# Patient Record
Sex: FEMALE | Race: WHITE | NOT HISPANIC OR LATINO | Employment: STUDENT | URBAN - METROPOLITAN AREA
[De-identification: names, ages, dates, MRNs, and addresses within clinical notes are randomized per-mention and may not be internally consistent; named-entity substitution may affect disease eponyms.]

---

## 2017-08-24 ENCOUNTER — TELEPHONE (OUTPATIENT)
Dept: UROLOGY | Facility: CLINIC | Age: 22
End: 2017-08-24

## 2017-08-28 ENCOUNTER — OFFICE VISIT (OUTPATIENT)
Dept: UROLOGY | Facility: CLINIC | Age: 22
End: 2017-08-28
Payer: COMMERCIAL

## 2017-08-28 ENCOUNTER — HOSPITAL ENCOUNTER (OUTPATIENT)
Dept: RADIOLOGY | Facility: HOSPITAL | Age: 22
Discharge: HOME | End: 2017-08-28
Attending: NURSE PRACTITIONER
Payer: COMMERCIAL

## 2017-08-28 ENCOUNTER — DOCUMENTATION ONLY (OUTPATIENT)
Dept: UROLOGY | Facility: CLINIC | Age: 22
End: 2017-08-28

## 2017-08-28 VITALS
BODY MASS INDEX: 20.21 KG/M2 | HEIGHT: 64 IN | SYSTOLIC BLOOD PRESSURE: 107 MMHG | HEART RATE: 58 BPM | TEMPERATURE: 98 F | WEIGHT: 118.38 LBS | DIASTOLIC BLOOD PRESSURE: 63 MMHG

## 2017-08-28 DIAGNOSIS — R10.9 BILATERAL FLANK PAIN: Primary | ICD-10-CM

## 2017-08-28 DIAGNOSIS — R39.15 URINARY URGENCY: ICD-10-CM

## 2017-08-28 DIAGNOSIS — R10.9 BILATERAL FLANK PAIN: ICD-10-CM

## 2017-08-28 DIAGNOSIS — R35.0 URINARY FREQUENCY: ICD-10-CM

## 2017-08-28 DIAGNOSIS — N13.30 HYDRONEPHROSIS, UNSPECIFIED HYDRONEPHROSIS TYPE: ICD-10-CM

## 2017-08-28 PROCEDURE — 99999 PR PBB SHADOW E&M-EST. PATIENT-LVL IV: CPT | Mod: PBBFAC,,, | Performed by: NURSE PRACTITIONER

## 2017-08-28 PROCEDURE — 74176 CT ABD & PELVIS W/O CONTRAST: CPT | Mod: TC

## 2017-08-28 PROCEDURE — 3008F BODY MASS INDEX DOCD: CPT | Mod: S$GLB,,, | Performed by: NURSE PRACTITIONER

## 2017-08-28 PROCEDURE — 99204 OFFICE O/P NEW MOD 45 MIN: CPT | Mod: S$GLB,,, | Performed by: NURSE PRACTITIONER

## 2017-08-28 PROCEDURE — 74176 CT ABD & PELVIS W/O CONTRAST: CPT | Mod: 26,,, | Performed by: RADIOLOGY

## 2017-08-28 RX ORDER — HYDROCODONE BITARTRATE AND ACETAMINOPHEN 5; 325 MG/1; MG/1
1 TABLET ORAL EVERY 6 HOURS PRN
Qty: 30 TABLET | Refills: 0 | Status: SHIPPED | OUTPATIENT
Start: 2017-08-28 | End: 2017-09-07

## 2017-08-28 RX ORDER — LEVOTHYROXINE SODIUM 25 UG/1
TABLET ORAL
Refills: 1 | COMMUNITY
Start: 2017-08-14 | End: 2018-03-21

## 2017-08-28 RX ORDER — TRAMADOL HYDROCHLORIDE 50 MG/1
TABLET ORAL
Refills: 0 | COMMUNITY
Start: 2017-08-14 | End: 2017-08-28 | Stop reason: ALTCHOICE

## 2017-08-28 NOTE — PATIENT INSTRUCTIONS
Flank Pain, Uncertain Cause  The flank is the area between your upper abdomen and your back. Pain there is often caused by a problem with your kidneys. It might be a kidney infection or a kidney stone. Other causes of flank pain include spinal arthritis, a pinched nerve from a back injury, or a back muscle strain or spasm.  The cause of your flank pain is not certain. You may need other tests.  Home care  Follow these tips when caring for yourself at home:  · You may use acetaminophen or ibuprofen to control pain, unless your health care provider prescribed another medicine. If you have chronic liver or kidney disease, talk with your provider before taking these medicines. Also talk with your provider first if youve ever had a stomach ulcer or GI bleeding.  · If the pain is coming from your muscles, you may get relief with ice or heat. During the first 2 days after the injury, put an ice pack on the painful area for 20 minutes every 2 to 4 hours. This will reduce swelling and pain. A hot shower, hot bath, or heating pad works well for a muscle spasm. You can start with ice, then switch to heat after 2 days. You might find that alternating ice and heat works well. Use the method that feels the best to you.  Follow-up care  Follow up with your healthcare provider if your symptoms dont get better over the next few days.  When to seek medical advice  Call your healthcare provider right away if any of these happen:  · Repeated vomiting  · Fever of 100.4ºF (38ºC) or higher, or as directed by your health care provider  · Flank pain that gets worse  · Pain that spreads to the front of your belly (abdomen)  · Dizziness, weakness, or fainting  · Blood in your urine  · Burning feeling when you urinate or the need to urinate often  · Pain in one of your legs that gets worse  · Numbness or weakness in a leg  Date Last Reviewed: 10/1/2016  © 5274-0516 [employer]. [address] [place] [address], [place] [address]. All  rights reserved. This information is not intended as a substitute for professional medical care. Always follow your healthcare professional's instructions.

## 2017-08-28 NOTE — PROGRESS NOTES
[facility] Note  Staff: MARIO Fields-C      Chief Complaint: Bilateral flank pain, urinary urgency and frequency    Subjective:        HPI: Briseida Quiles is a 22 y.o. female new patient to Urology clinic presents with bilateral flank pain, urinary urgency and frequency which began three weeks ago.  Pt initially went to Dr. Bridget Blackburn for evaluation of symptoms and was treated for possible kidney infection.    The following imaging was performed at [facility] imaging on 08/23/17:  Abdominal US:  The right kidney is to be within the range of normal in size and appearance.  The LEFT kidney demonstrates what appears to be mild hydronephrosis with because not identified and with findings not definitive and possible/chronic/congenital.  US findings and the pt's clinical history are concerning for potential renal calculus disease.  Noncontrast CT of the abdomen and pelvis is suggested for further workup.    KUB:  IMPRESSION:  Possible left mid ureteral calculus vs. Artifact overlying the upper left sacral wing as detailed and discussed above.    Questions asked pt during office visit today:  Pt has increased urgency and frequency of urination within last three weeks.  +Bilateral flank pain-more prominently on left side  No dysuria, no hx of kidney stones  G0, P 0,   Gross Hematuria:  Yes - one week ago.  History of UTI: Yes - since young age.  Sexually active?: Yes, she is not using any form of BC  LMP: one month ago, unsure when her last cycle was.  LBM: This morning, but she states having constipation issues.    Caffeine intake daily: at least 3 bottles of water daily, one coke every other day.    REVIEW OF SYSTEMS:  Review of Systems   Constitutional: Negative for chills, diaphoresis, fever and weight loss.   HENT: Negative for congestion, hearing loss, nosebleeds and sore throat.    Eyes: Negative for blurred vision and pain.   Respiratory: Negative for cough and wheezing.     Cardiovascular: Negative for chest pain, palpitations and leg swelling.   Gastrointestinal: Negative for abdominal pain, heartburn, nausea and vomiting.   Genitourinary: Positive for flank pain, frequency and urgency. Negative for dysuria and hematuria.   Musculoskeletal: Negative for back pain, joint pain, myalgias and neck pain.   Skin: Negative for itching and rash.   Neurological: Negative for dizziness, tremors, sensory change, seizures, loss of consciousness, weakness and headaches.   Endo/Heme/Allergies: Does not bruise/bleed easily.   Psychiatric/Behavioral: Negative for depression and suicidal ideas. The patient is not nervous/anxious.      PMHx:  Past Medical History:   Diagnosis Date    Mononucleosis 2011     Kidney stones: No    PSHx:  History reviewed. No pertinent surgical history.      Fam Hx:   malignancies: No , gyn malignancies: No   kidney stones: No     Social History     Social History    Marital status: Single     Spouse name: N/A    Number of children: N/A    Years of education: N/A     Social History Main Topics    Smoking status: Never Smoker    Smokeless tobacco: Never Used    Alcohol use No    Drug use: No    Sexual activity: No     Other Topics Concern    None     Social History Narrative    Going to college 2015/2016 at [employer] for [employer]     Allergies:  Review of patient's allergies indicates no known allergies.    Medications: reviewed   Anticoagulation: No    Objective:     Vitals:    08/28/17 1429   BP: 107/63   Pulse: (!) 58   Temp: 98.1 °F (36.7 °C)     Physical Exam   Vitals reviewed.  Constitutional: She is oriented to person, place, and time. She appears well-developed and well-nourished.   HENT:   Head: Normocephalic and atraumatic.   Eyes: Conjunctivae and EOM are normal. Pupils are equal, round, and reactive to light.   Neck: Normal range of motion. Neck supple.   Cardiovascular: Normal rate, regular rhythm, normal heart sounds and intact distal pulses.     Pulmonary/Chest: Effort normal and breath sounds normal.   Abdominal: Soft. Bowel sounds are normal.   Musculoskeletal: Normal range of motion.   Neurological: She is alert and oriented to person, place, and time. She has normal reflexes.   Skin: Skin is warm and dry.     Psychiatric: She has a normal mood and affect. Her behavior is normal. Judgment and thought content normal.     LABS REVIEW:  UA today: Color:Clear, Yellow  Spec. Grav.  1.000  PH  7.0  Negative for leukocytes, nitrates, protein, glucose, ketones, urobili, bili, and blood.    Cr:   Lab Results   Component Value Date    CREATININE 0.7 04/10/2014     Assessment:       1. Bilateral flank pain    2. Hydronephrosis, unspecified hydronephrosis type    3. Urinary urgency    4. Urinary frequency          Plan:   Left hydronephrosis of unknown cause vs. Left ureteral calculus:    1.  Call Dr. Blackburn's office and get latest CBC and CMP results done two weeks ago as stated by the patient.  2.  Pregnancy test to be performed by lab today.  3.  CT RSS for further evaluation of symptoms and as requested from previous xray results.  4.  Norco 5-325 prn pain prescribed to the patient during ov today.    F/u TBD    [facility]: Active    BRO Corrales

## 2017-08-28 NOTE — LETTER
August 28, 2017      Bridget Blackburn, JOSE  [address] [facility] [address]           [facility]  [address]  [facility] [address]  Phone: [phone]          Patient: Briseida Quiles   MR Number: [id number]   YOB: 1995   Date of Visit: 8/28/2017       Dear Bridget Blackburn:    Thank you for referring Briseida Quiles to me for evaluation. Attached you will find relevant portions of my assessment and plan of care.    If you have questions, please do not hesitate to call me. I look forward to following Briseida Quiles along with you.    Sincerely,    Bridget Kaiser, [facility]    Enclosure  CC:  No Recipients    If you would like to receive this communication electronically, please contact [email or web address] or [phone] to request more information on [employer] Link access.    For providers and/or their staff who would like to refer a patient to [facility], please contact us through our one-stop-shop provider referral line, [facility], at [phone].    If you feel you have received this communication in error or would no longer like to receive these types of communications, please e-mail [email or web address]

## 2017-08-28 NOTE — PROGRESS NOTES
Labwork just received after pt left:  [facility]-collected on 08/10/17 by Dr. Bridget Blackburn's office.    BUN/Cr: 14/0.85  GFR: 99    CBC WNL

## 2017-09-05 ENCOUNTER — OFFICE VISIT (OUTPATIENT)
Dept: UROLOGY | Facility: CLINIC | Age: 22
End: 2017-09-05
Payer: COMMERCIAL

## 2017-09-05 VITALS
HEIGHT: 65 IN | SYSTOLIC BLOOD PRESSURE: 118 MMHG | WEIGHT: 116.63 LBS | BODY MASS INDEX: 19.43 KG/M2 | HEART RATE: 74 BPM | DIASTOLIC BLOOD PRESSURE: 75 MMHG | TEMPERATURE: 98 F

## 2017-09-05 DIAGNOSIS — N32.81 OAB (OVERACTIVE BLADDER): Primary | ICD-10-CM

## 2017-09-05 DIAGNOSIS — K59.00 CONSTIPATION, UNSPECIFIED CONSTIPATION TYPE: ICD-10-CM

## 2017-09-05 LAB
BILIRUB SERPL-MCNC: NORMAL MG/DL
BLOOD URINE, POC: NORMAL
COLOR, POC UA: YELLOW
GLUCOSE UR QL STRIP: NORMAL
KETONES UR QL STRIP: NORMAL
LEUKOCYTE ESTERASE URINE, POC: NORMAL
NITRITE, POC UA: NORMAL
PH, POC UA: 5
PROTEIN, POC: NORMAL
SPECIFIC GRAVITY, POC UA: 1.02
UROBILINOGEN, POC UA: NORMAL

## 2017-09-05 PROCEDURE — 81002 URINALYSIS NONAUTO W/O SCOPE: CPT | Mod: S$GLB,,, | Performed by: UROLOGY

## 2017-09-05 PROCEDURE — 99214 OFFICE O/P EST MOD 30 MIN: CPT | Mod: 25,S$GLB,, | Performed by: UROLOGY

## 2017-09-05 PROCEDURE — 99999 PR PBB SHADOW E&M-EST. PATIENT-LVL III: CPT | Mod: PBBFAC,,, | Performed by: UROLOGY

## 2017-09-05 PROCEDURE — 3008F BODY MASS INDEX DOCD: CPT | Mod: S$GLB,,, | Performed by: UROLOGY

## 2017-09-05 NOTE — PROGRESS NOTES
[facility] Note - [place]   Staff: MD Carla    Referring provider and please cc: Reggie  PCP: Dr.Melissa Alexey Thomastoni: active    Chief Complaint: hydronephrosis and urinary discomfort    Subjective:        HPI: Briseida Quiles is a 22 y.o. female presents with     Pt initially saw her pcp  about a month ago for bilateral flank pain and urinary frequency, urgency, some incontinence. She was treated with abx bid x 1 week but her sx did not improve. She feels like her bladder was not emptying. They obtained an ultrasound 8/23/17 of her kidneys which some mild swelling on her left side and an xray which showed possible stone in left ureter. She was then seen by azam on 8/28/17 with similar complaints. Urine that day was negative for blood or infection. She ordered a ct renal stone study which showed no stones, no hydro and large amount of stool in her colon.     She returns today stating she still having feelings of incomplete empyting, no flank pain. Frequency q1 hour. Some urethral pain today. +urgency occasionally. No urge incotinence in the past few weeks.    She has a h/o significant constipation since 7th grade (didn't have a bm for a month), had a workup. Since then she has a bm every other day to one x week and it's hard stool. Had tried to take stool softener. Tried colace for a few days but was concerned about.     Has had sx of uti's for many years similar to what she has now. Has had abx 1-2x before. Has never seen a urologist before. Never urologic issues as a kidney. Potty trained at 18 months. No bedwetting. No coffee, tea or coke. Drinks 3 bottles of water a day.     Gf with h/o stones    ECOG Status: 0    G0, P 0  Sexually active - does not think sx ass'd with intercoruse.    REVIEW OF SYSTEMS:  General ROS: no fevers, no chills  Psychological ROS: no depression  Endocrine ROS: no heat or cold  Respiratory ROS: no SOB  Cardiovascular ROS: + CP, recently  diagnosed with levothryoxine  Gastrointestinal ROS: no abdominal pain, + constipation, no diarrhea, noBRBPR  Musculoskeletal ROS: no muscle pain  Neurological ROS: no headaches  Dermatological ROS: + rashes assd' with new medicine use  HEENT: no glasses, no sinus   ROS: per HPI     PMHx:  Past Medical History:   Diagnosis Date    Mononucleosis 2011   hypothyroid  Kidney stones: Yes - possibly?     PSHx:  No past surgical history on file.  Urologic or Gynecologic Surgery: none    Stents/Valves/Foreign Bodies: No  Cardiac Evaluation: No    Screening Studies  Colonoscopy: none    Fam Hx:   malignancies: No , gyn malignancies: Yes - maternal GGM with breast cancer .   kidney stones: Yes - maternal gF with stones     Soc Hx:  No tobacco  occ alcohol  Lives in , parents in [place]  :unmarried  Children: no kids  Occupation: [employer] at [employer]    Allergies:  Review of patient's allergies indicates no known allergies.    Medications: reviewed   Anticoagulation: No    Objective:     Vitals:    09/05/17 1055   BP: 118/75   Pulse: 74   Temp: 98.4 °F (36.9 °C)     General:WDWN in NAD  Eyes: PERRLA, normal conjunctiva  Respiratory: no increased work on breathing, clear to auscultation  Cardiovascular: regular rate and rhythm. No obvious extremity edema.  GI: no palpation of masses. No tenderness. No hepatosplenomegaly to palpation.  Musculoskeletal: normal range of motion of bilateral upper extremities. Normal muscle strength and tone.  Skin: no obvious rashes or lesions. No tightening of skin noted.  Neurologic: CN grossly normal. Normal sensation.   Psychiatric: awake, alert and oriented x 3. Mood and affect normal. Cooperative.    Pelvic exam  Deferred    LABS REVIEW:  UA today: 1.025/5/remainder neg  UCx:   none  Cr:   Lab Results   Component Value Date    CREATININE 0.7 04/10/2014       PATHOLOGY REVIEW:  none    RADIOGRAPHIC REVIEW:  Ctrss 8/28/17  Small amount of free fluid identified in the right cul-de-sac  otherwise negative CT of the abdomen and pelvis.  No evidence of kidney stones or hydronephrosis.    Colon appears to be filled with stool slightly anterior to bladder  5cm colon with stool.     rbus 8/23/17 dis, will load images  Mild hydro in left kidney  Right kidney normal    kub 8/23/17 dis  Possible left mid ureteral calculus   Other findings: slight congential scoliosis      Assessment:       1. OAB (overactive bladder)    2. Constipation, unspecified constipation type        Plan:     I think her oab and urgency is related to her significant constipation which she has had for years. Review of her ct and review of her history with her today reveals. She could possibly have passed a small stone as well but these symptoms have been present for some time.    Recommend capful daily until she has bm/diarrhea then titrate to soft bm's. Went over [facility] stool chart. We want type 4.   She will then f/u in 4-8 weeks to see if she has improvement of symptomst  If not may consider referral to GI    Will eventually titrate off miralax.    F/u 4-8 weeks  She can contact me via [employer] with any questions      Shwetha Aguirre MD

## 2017-09-25 ENCOUNTER — TELEPHONE (OUTPATIENT)
Dept: UROLOGY | Facility: CLINIC | Age: 22
End: 2017-09-25

## 2018-03-21 ENCOUNTER — HOSPITAL ENCOUNTER (INPATIENT)
Facility: HOSPITAL | Age: 23
LOS: 3 days | Discharge: HOME | DRG: 872 | End: 2018-03-24
Attending: EMERGENCY MEDICINE | Admitting: INTERNAL MEDICINE
Payer: COMMERCIAL

## 2018-03-21 DIAGNOSIS — R65.20 SEVERE SEPSIS: Primary | ICD-10-CM

## 2018-03-21 DIAGNOSIS — N12 PYELONEPHRITIS: ICD-10-CM

## 2018-03-21 DIAGNOSIS — A41.9 SEVERE SEPSIS: Primary | ICD-10-CM

## 2018-03-21 PROBLEM — E03.9 HYPOTHYROIDISM: Chronic | Status: ACTIVE | Noted: 2018-03-21

## 2018-03-21 LAB
ALBUMIN SERPL BCP-MCNC: 4.8 G/DL
ALP SERPL-CCNC: 77 U/L
ALT SERPL W/O P-5'-P-CCNC: 14 U/L
ANION GAP SERPL CALC-SCNC: 14 MMOL/L
APTT BLDCRRT: 28.2 SEC
AST SERPL-CCNC: 23 U/L
B-HCG UR QL: NEGATIVE
BASOPHILS # BLD AUTO: 0 K/UL
BASOPHILS NFR BLD: 0 %
BILIRUB SERPL-MCNC: 0.5 MG/DL
BILIRUB UR QL STRIP: NEGATIVE
BUN SERPL-MCNC: 12 MG/DL
CALCIUM SERPL-MCNC: 10.1 MG/DL
CHLORIDE SERPL-SCNC: 103 MMOL/L
CLARITY UR: CLEAR
CO2 SERPL-SCNC: 22 MMOL/L
COLOR UR: ABNORMAL
COLOR UR: ABNORMAL
COLOR UR: YELLOW
CREAT SERPL-MCNC: 0.7 MG/DL
CTP QC/QA: YES
DIFFERENTIAL METHOD: ABNORMAL
EOSINOPHIL # BLD AUTO: 0 K/UL
EOSINOPHIL NFR BLD: 0.2 %
ERYTHROCYTE [DISTWIDTH] IN BLOOD BY AUTOMATED COUNT: 13.4 %
EST. GFR  (AFRICAN AMERICAN): >60 ML/MIN/1.73 M^2
EST. GFR  (NON AFRICAN AMERICAN): >60 ML/MIN/1.73 M^2
GLUCOSE SERPL-MCNC: 99 MG/DL
GLUCOSE UR QL STRIP: NEGATIVE
HCT VFR BLD AUTO: 38.2 %
HGB BLD-MCNC: 12.9 G/DL
HGB UR QL STRIP: NEGATIVE
INR PPP: 1.1
KETONES UR QL STRIP: ABNORMAL
KETONES UR QL STRIP: ABNORMAL
KETONES UR QL STRIP: NEGATIVE
LACTATE SERPL-SCNC: 1.1 MMOL/L
LACTATE SERPL-SCNC: 3.5 MMOL/L
LEUKOCYTE ESTERASE UR QL STRIP: NEGATIVE
LYMPHOCYTES # BLD AUTO: 1.2 K/UL
LYMPHOCYTES NFR BLD: 7.5 %
MAGNESIUM SERPL-MCNC: 2.3 MG/DL
MCH RBC QN AUTO: 30.3 PG
MCHC RBC AUTO-ENTMCNC: 33.7 G/DL
MCV RBC AUTO: 90 FL
MONOCYTES # BLD AUTO: 0.8 K/UL
MONOCYTES NFR BLD: 5.5 %
NEUTROPHILS # BLD AUTO: 13.3 K/UL
NEUTROPHILS NFR BLD: 86.8 %
NITRITE UR QL STRIP: NEGATIVE
PH UR STRIP: 6 [PH] (ref 5–8)
PHOSPHATE SERPL-MCNC: 3 MG/DL
PLATELET # BLD AUTO: 263 K/UL
PMV BLD AUTO: 8.4 FL
POCT GLUCOSE: 87 MG/DL (ref 70–110)
POTASSIUM SERPL-SCNC: 4 MMOL/L
PROT SERPL-MCNC: 9 G/DL
PROT UR QL STRIP: NEGATIVE
PROTHROMBIN TIME: 11.2 SEC
RBC # BLD AUTO: 4.25 M/UL
SODIUM SERPL-SCNC: 139 MMOL/L
SP GR UR STRIP: 1.01 (ref 1–1.03)
SP GR UR STRIP: 1.01 (ref 1–1.03)
SP GR UR STRIP: <=1.005 (ref 1–1.03)
URN SPEC COLLECT METH UR: ABNORMAL
UROBILINOGEN UR STRIP-ACNC: NEGATIVE EU/DL
WBC # BLD AUTO: 15.3 K/UL

## 2018-03-21 PROCEDURE — 82962 GLUCOSE BLOOD TEST: CPT

## 2018-03-21 PROCEDURE — 81025 URINE PREGNANCY TEST: CPT | Performed by: EMERGENCY MEDICINE

## 2018-03-21 PROCEDURE — 12000002 HC ACUTE/MED SURGE SEMI-PRIVATE ROOM

## 2018-03-21 PROCEDURE — 25500020 PHARM REV CODE 255

## 2018-03-21 PROCEDURE — 36415 COLL VENOUS BLD VENIPUNCTURE: CPT

## 2018-03-21 PROCEDURE — 87040 BLOOD CULTURE FOR BACTERIA: CPT

## 2018-03-21 PROCEDURE — 96375 TX/PRO/DX INJ NEW DRUG ADDON: CPT

## 2018-03-21 PROCEDURE — 93005 ELECTROCARDIOGRAM TRACING: CPT

## 2018-03-21 PROCEDURE — 83605 ASSAY OF LACTIC ACID: CPT

## 2018-03-21 PROCEDURE — 99223 1ST HOSP IP/OBS HIGH 75: CPT | Mod: ,,, | Performed by: INTERNAL MEDICINE

## 2018-03-21 PROCEDURE — 25000003 PHARM REV CODE 250: Performed by: EMERGENCY MEDICINE

## 2018-03-21 PROCEDURE — 84100 ASSAY OF PHOSPHORUS: CPT

## 2018-03-21 PROCEDURE — 63600175 PHARM REV CODE 636 W HCPCS: Performed by: EMERGENCY MEDICINE

## 2018-03-21 PROCEDURE — 80053 COMPREHEN METABOLIC PANEL: CPT

## 2018-03-21 PROCEDURE — 85025 COMPLETE CBC W/AUTO DIFF WBC: CPT

## 2018-03-21 PROCEDURE — 87086 URINE CULTURE/COLONY COUNT: CPT

## 2018-03-21 PROCEDURE — 81003 URINALYSIS AUTO W/O SCOPE: CPT

## 2018-03-21 PROCEDURE — 99285 EMERGENCY DEPT VISIT HI MDM: CPT | Mod: 25

## 2018-03-21 PROCEDURE — 96365 THER/PROPH/DIAG IV INF INIT: CPT

## 2018-03-21 PROCEDURE — 83735 ASSAY OF MAGNESIUM: CPT

## 2018-03-21 PROCEDURE — 85730 THROMBOPLASTIN TIME PARTIAL: CPT

## 2018-03-21 PROCEDURE — 96361 HYDRATE IV INFUSION ADD-ON: CPT

## 2018-03-21 PROCEDURE — 85610 PROTHROMBIN TIME: CPT

## 2018-03-21 RX ORDER — ONDANSETRON 2 MG/ML
4 INJECTION INTRAMUSCULAR; INTRAVENOUS EVERY 8 HOURS PRN
Status: DISCONTINUED | OUTPATIENT
Start: 2018-03-21 | End: 2018-03-24 | Stop reason: HOSPADM

## 2018-03-21 RX ORDER — PANTOPRAZOLE SODIUM 40 MG/1
40 TABLET, DELAYED RELEASE ORAL DAILY
Status: DISCONTINUED | OUTPATIENT
Start: 2018-03-22 | End: 2018-03-24 | Stop reason: HOSPADM

## 2018-03-21 RX ORDER — HYDROCODONE BITARTRATE AND ACETAMINOPHEN 5; 325 MG/1; MG/1
1 TABLET ORAL EVERY 6 HOURS PRN
Status: DISCONTINUED | OUTPATIENT
Start: 2018-03-21 | End: 2018-03-24 | Stop reason: HOSPADM

## 2018-03-21 RX ORDER — MORPHINE SULFATE 4 MG/ML
4 INJECTION, SOLUTION INTRAMUSCULAR; INTRAVENOUS
Status: COMPLETED | OUTPATIENT
Start: 2018-03-21 | End: 2018-03-21

## 2018-03-21 RX ORDER — KETOROLAC TROMETHAMINE 30 MG/ML
15 INJECTION, SOLUTION INTRAMUSCULAR; INTRAVENOUS EVERY 6 HOURS PRN
Status: DISCONTINUED | OUTPATIENT
Start: 2018-03-21 | End: 2018-03-24 | Stop reason: HOSPADM

## 2018-03-21 RX ORDER — ONDANSETRON HYDROCHLORIDE 4 MG/5ML
8 SOLUTION ORAL EVERY 8 HOURS PRN
Status: DISCONTINUED | OUTPATIENT
Start: 2018-03-21 | End: 2018-03-24 | Stop reason: HOSPADM

## 2018-03-21 RX ORDER — SODIUM CHLORIDE 9 MG/ML
INJECTION, SOLUTION INTRAVENOUS
Status: DISPENSED
Start: 2018-03-21 | End: 2018-03-22

## 2018-03-21 RX ORDER — THYROID 30 MG/1
30 TABLET ORAL
Status: DISCONTINUED | OUTPATIENT
Start: 2018-03-22 | End: 2018-03-22

## 2018-03-21 RX ORDER — SODIUM CHLORIDE 9 MG/ML
INJECTION, SOLUTION INTRAVENOUS CONTINUOUS
Status: DISCONTINUED | OUTPATIENT
Start: 2018-03-22 | End: 2018-03-24 | Stop reason: HOSPADM

## 2018-03-21 RX ORDER — THYROID 30 MG/1
30 TABLET ORAL
Status: ON HOLD | COMMUNITY
End: 2018-03-24 | Stop reason: HOSPADM

## 2018-03-21 RX ORDER — AMOXICILLIN 250 MG
1 CAPSULE ORAL 2 TIMES DAILY PRN
Status: DISCONTINUED | OUTPATIENT
Start: 2018-03-21 | End: 2018-03-24 | Stop reason: HOSPADM

## 2018-03-21 RX ORDER — ACETAMINOPHEN 325 MG/1
650 TABLET ORAL EVERY 6 HOURS PRN
Status: DISCONTINUED | OUTPATIENT
Start: 2018-03-21 | End: 2018-03-24 | Stop reason: HOSPADM

## 2018-03-21 RX ADMIN — MORPHINE SULFATE 4 MG: 4 INJECTION INTRAVENOUS at 05:03

## 2018-03-21 RX ADMIN — ONDANSETRON HYDROCHLORIDE 4 MG: 2 INJECTION INTRAMUSCULAR; INTRAVENOUS at 09:03

## 2018-03-21 RX ADMIN — STANDARDIZED SENNA CONCENTRATE AND DOCUSATE SODIUM 1 TABLET: 8.6; 5 TABLET, FILM COATED ORAL at 10:03

## 2018-03-21 RX ADMIN — PIPERACILLIN AND TAZOBACTAM 4.5 G: 4; .5 INJECTION, POWDER, LYOPHILIZED, FOR SOLUTION INTRAVENOUS; PARENTERAL at 05:03

## 2018-03-21 RX ADMIN — IOHEXOL 75 ML: 350 INJECTION, SOLUTION INTRAVENOUS at 04:03

## 2018-03-21 RX ADMIN — SODIUM CHLORIDE 1632 ML: 0.9 INJECTION, SOLUTION INTRAVENOUS at 04:03

## 2018-03-21 NOTE — LETTER
March 24, 2018         [address] [place] [address]  Phone: [phone]       Patient: Briseida Quiles   YOB: 1995  Date of Visit: 03/24/2018    To Whom It May Concern:    Brian Quiles  was at [facility] on 3/21/2018 to 03/24/2018. SHE may return to work/school on 3/25/2018, with no restrictions. If you have any questions or concerns, or if I can be of further assistance, please do not hesitate to contact me.    Sincerely,    Viky Cottrell RN

## 2018-03-21 NOTE — ED PROVIDER NOTES
"Encounter Date: 3/21/2018    SCRIBE #1 NOTE: IFredi, am scribing for, and in the presence of, Dr. Burrell .       History     Chief Complaint   Patient presents with    Abdominal Pain     x 3 weeks        03/21/2018 4:10 PM     Chief complaint: Abd pain       Briseida Quiles is a 22 y.o. female with a hx of Mononucleosis who presents to the ED with complaints of throat pain and diffuse abd pain x 3 weeks. Associated sx are back pain,  nausea, urinary urgency, frequency, and dysuria. She also reports "light" stool 2 days ago. Per mother, PCP reported concern of UTI and pyelonephrosis and advised she present to the ED for further evaluation. She was recently on abx. Bronchitis and Influenza. Pt is sexually active with one partner in the past 6 months. She denies vaginal discharge, diarrhea, vomiting, fever, chills, and diaphoresis. NKDA noted.       The history is provided by the patient.     Review of patient's allergies indicates:  No Known Allergies  Past Medical History:   Diagnosis Date    Mononucleosis 2011    RSV (acute bronchiolitis due to respiratory syncytial virus)      Past Surgical History:   Procedure Laterality Date    WISDOM TOOTH EXTRACTION       Family History   Problem Relation Age of Onset    Diabetes Maternal Grandmother     Obesity Maternal Grandmother     Heart disease Maternal Grandfather     Diabetes Maternal Grandfather     Hypertension Maternal Grandfather     Cancer Maternal Grandfather     Hyperlipidemia Paternal Grandmother     ADD / ADHD Neg Hx     Alcohol abuse Neg Hx     Allergies Neg Hx     Asthma Neg Hx     Autism spectrum disorder Neg Hx     Behavior problems Neg Hx     Birth defects Neg Hx     Chromosomal disorder Neg Hx     Cleft lip Neg Hx     Congenital heart disease Neg Hx     Depression Neg Hx     Early death Neg Hx     Eczema Neg Hx     Hearing loss Neg Hx     Kidney disease Neg Hx     Learning disabilities Neg Hx     Mental illness Neg Hx  "    Migraines Neg Hx     Neurodegenerative disease Neg Hx     Seizures Neg Hx     SIDS Neg Hx     Thyroid disease Neg Hx     Other Neg Hx      Social History   Substance Use Topics    Smoking status: Never Smoker    Smokeless tobacco: Never Used    Alcohol use Yes      Comment: last drink over a month, socially      Review of Systems   Constitutional: Negative for chills, diaphoresis, fatigue and fever.   HENT: Positive for sore throat.    Eyes: Negative for redness.   Respiratory: Negative for shortness of breath.    Cardiovascular: Negative for chest pain.   Gastrointestinal: Positive for abdominal pain and nausea. Negative for diarrhea and vomiting.   Genitourinary: Positive for dysuria, frequency and urgency. Negative for vaginal discharge.   Musculoskeletal: Positive for back pain.   Skin: Negative for rash.   Neurological: Negative for weakness.   Hematological: Does not bruise/bleed easily.       Physical Exam     Initial Vitals [03/21/18 1450]   BP Pulse Resp Temp SpO2   (!) 146/72 (!) 127 20 98.1 °F (36.7 °C) 95 %      MAP       96.67         Physical Exam    Nursing note and vitals reviewed.  Constitutional: She appears well-developed and well-nourished.  Non-toxic appearance. No distress.   HENT:   Head: Normocephalic and atraumatic.   Mouth/Throat: Mucous membranes are dry.   Eyes: EOM are normal. Pupils are equal, round, and reactive to light.   Neck: Normal range of motion. Neck supple. No neck rigidity. No JVD present.   Cardiovascular: Normal rate, regular rhythm, normal heart sounds and intact distal pulses. Exam reveals no gallop and no friction rub.    No murmur heard.  Pulmonary/Chest: Breath sounds normal. She has no wheezes. She has no rhonchi. She has no rales.   Abdominal: Soft. Bowel sounds are normal. She exhibits no distension. There is tenderness in the right upper quadrant, right lower quadrant, left upper quadrant and left lower quadrant. There is no rigidity, no rebound and no  guarding.   Most tender on lower quadrants.    Musculoskeletal: Normal range of motion.   Bilateral CVA tenderness.    Neurological: She is alert and oriented to person, place, and time. She has normal strength and normal reflexes. No cranial nerve deficit or sensory deficit. She exhibits normal muscle tone. Coordination normal. GCS eye subscore is 4. GCS verbal subscore is 5. GCS motor subscore is 6.   Skin: Skin is warm and dry.   Psychiatric: She has a normal mood and affect. Her speech is normal and behavior is normal. She is not actively hallucinating.         ED Course   Procedures  Labs Reviewed   URINALYSIS - Abnormal; Notable for the following:        Result Value    Specific Gravity, UA <=1.005 (*)     All other components within normal limits   COMPREHENSIVE METABOLIC PANEL - Abnormal; Notable for the following:     CO2 22 (*)     Total Protein 9.0 (*)     All other components within normal limits   LACTIC ACID, PLASMA - Abnormal; Notable for the following:     Lactate (Lactic Acid) 3.5 (*)     All other components within normal limits    Narrative:     Lactic acid critical result(s) called and verbal readback obtained   from Jamin De La O.  , 03/21/2018 17:42   CBC W/ AUTO DIFFERENTIAL - Abnormal; Notable for the following:     WBC 15.30 (*)     MPV 8.4 (*)     Gran # (ANC) 13.3 (*)     Gran% 86.8 (*)     Lymph% 7.5 (*)     All other components within normal limits   URINALYSIS - Abnormal; Notable for the following:     Color, UA Other (*)     Ketones, UA 1+ (*)     All other components within normal limits   URINALYSIS - Abnormal; Notable for the following:     Color, UA Other (*)     Ketones, UA 1+ (*)     All other components within normal limits   MAGNESIUM   PHOSPHORUS   PROTIME-INR   APTT   LACTIC ACID, PLASMA   POCT URINE PREGNANCY   POCT GLUCOSE     EKG Readings: (Independently Interpreted)   Initial Reading: No STEMI.   NSR at a rate of 99 bpm. Incomplete right bundle branch. Normal axis.  Possible left atrial enlargement.           Medical Decision Making:   History:   Old Medical Records: I decided to obtain old medical records.  Initial Assessment:   22-year-old woman that is sent from PCP for evaluation of pyelonephritis.  She had  urine dip at her primary care's office that revealed nitrite positive urine with many leukocytesafter having urinary symptoms and flank pain for several weeks.  She meets SIRS criteria with tachycardia and leukocytosis.  I considered sepsis at 1642.  She meets severe sepsis criteria with elevated lactic acid level greater than 2 .  Tracely, urinalysis here fails to show evidence of UTI which is conflicting.  Patient does have significant abdominal tenderness and bilateral CVA tenderness.  Cultures obtained and patient started on empiric broad-spectrum antibiotics with Zosyn.  No evidence of septic shock.  She is hemodynamically stable at this time.  Discussed with Dr. Crump who agrees to admit the patient for treatment of severe sepsis.    Clinical Tests:   Lab Tests: Reviewed and Ordered  Radiological Study: Reviewed and Ordered  Medical Tests: Reviewed and Ordered  ED Management:  Critical Care Time MDM    The high probability of sudden, clinically significant deterioration in the patient's condition required the highest level of my preparedness to intervene urgently.    The services I provided to this patient were to treat and/or prevent clinically significant deterioration that could result in permanent disability, chronic pain and/or death.     Services included the following: chart data review, reviewing nursing notes and/or old charts, documentation time, consultant collaboration regarding findings and treatment options, medication orders and management, direct patient care, vital sign assessments and ordering, interpreting and reviewing diagnostic studies/lab tests.    Aggregate critical care time was between 30 and 74 minutes, which includes only time during  which I was engaged in work directly related to the patient's care, as described above, whether at the bedside or elsewhere in the Emergency Department.     Manuel Burrell M.D.         Imaging Results          CT Abdomen Pelvis With Contrast (Final result)  Result time 03/21/18 18:04:42    Final result by Humphrey Barnett Jr., MD (03/21/18 18:04:42)                 Impression:      Small amount of fluid identified in the right cul-de-sac probably physiologic.  Prominent amount of stool and gas seen in the colon suggesting constipation.  Otherwise negative CT of the abdomen and pelvis.      Electronically signed by: Humphrey Barnett MD  Date:    03/21/2018  Time:    18:04             Narrative:    EXAMINATION:  CT ABDOMEN PELVIS WITH CONTRAST    CLINICAL HISTORY:  Abdominal pain, unspecified;    TECHNIQUE:  Low dose axial images, sagittal and coronal reformations were obtained from the lung bases to the pubic symphysis following the IV administration of 75 mL of Omnipaque 350 and the oral administration of 30 mL of Omnipaque 350.    COMPARISON:  CT of August 28, 2017    FINDINGS:  The liver is of neural size contour and CT density without focal defect.  The gallbladder is of no or mole size without CT evidence of stone.  The pancreas is of normal contour and CT density without focal mass.  The spleen is of no mole size and CT density without focal defect.    The adrenal glands are not enlarged.  The kidneys are normal size contour and contrast enhancement without mass cyst or hydronephrosis.  The abdominal aorta and inferior vena cava are of no or mole caliber.  No retroperitoneal or periaortic adenopathy or masses are seen.    There is a prominent amount of stool and gas seen in the colon.  A normal appendix is identified in the right pelvis.  Bowel dilatation or air-fluid levels are not seen.    Within the pelvis the uterus and ovaries appear of normal-size for the patient's age.  A small amount of fluid is  seen in the right cul de sac.  The bladder is of normal contour without mass or asymmetry.                                      Scribe Attestation:   Scribe #1: I performed the above scribed service and the documentation accurately describes the services I performed. I attest to the accuracy of the note.    I, Indra Neff, personally performed the services described in this documentation. All medical record entries made by the scribe were at my direction and in my presence.  I have reviewed the chart and agree that the record reflects my personal performance and is accurate and complete. Manuel Burrell MD.  1:34 PM 03/23/2018             Clinical Impression:     1. Severe sepsis    2. Pyelonephritis        Disposition:   Disposition: Admitted                        Manuel Burrell MD  03/23/18 2944

## 2018-03-21 NOTE — ED NOTES
Here for eval of approx 3-week c/o diffuse abd pain in light of decreased size & freq of BMs (noted frequent urinations this visit). Appears non-toxic.

## 2018-03-22 LAB
ALBUMIN SERPL BCP-MCNC: 3.8 G/DL
ALP SERPL-CCNC: 57 U/L
ALT SERPL W/O P-5'-P-CCNC: 12 U/L
ANION GAP SERPL CALC-SCNC: 9 MMOL/L
AST SERPL-CCNC: 18 U/L
BACTERIA UR CULT: NORMAL
BASOPHILS # BLD AUTO: 0 K/UL
BASOPHILS NFR BLD: 0.4 %
BILIRUB SERPL-MCNC: 0.9 MG/DL
BUN SERPL-MCNC: 8 MG/DL
CALCIUM SERPL-MCNC: 9.2 MG/DL
CHLORIDE SERPL-SCNC: 108 MMOL/L
CO2 SERPL-SCNC: 22 MMOL/L
CREAT SERPL-MCNC: 0.8 MG/DL
DIFFERENTIAL METHOD: ABNORMAL
EOSINOPHIL # BLD AUTO: 0 K/UL
EOSINOPHIL NFR BLD: 0.3 %
ERYTHROCYTE [DISTWIDTH] IN BLOOD BY AUTOMATED COUNT: 13.2 %
EST. GFR  (AFRICAN AMERICAN): >60 ML/MIN/1.73 M^2
EST. GFR  (NON AFRICAN AMERICAN): >60 ML/MIN/1.73 M^2
GLUCOSE SERPL-MCNC: 102 MG/DL
HCT VFR BLD AUTO: 32.5 %
HGB BLD-MCNC: 11.1 G/DL
LYMPHOCYTES # BLD AUTO: 1.5 K/UL
LYMPHOCYTES NFR BLD: 18.1 %
MAGNESIUM SERPL-MCNC: 2.1 MG/DL
MCH RBC QN AUTO: 30.7 PG
MCHC RBC AUTO-ENTMCNC: 34.1 G/DL
MCV RBC AUTO: 90 FL
MONOCYTES # BLD AUTO: 0.8 K/UL
MONOCYTES NFR BLD: 9.4 %
NEUTROPHILS # BLD AUTO: 5.9 K/UL
NEUTROPHILS NFR BLD: 71.8 %
PHOSPHATE SERPL-MCNC: 4 MG/DL
PLATELET # BLD AUTO: 224 K/UL
PMV BLD AUTO: 8.6 FL
POTASSIUM SERPL-SCNC: 3.8 MMOL/L
PROT SERPL-MCNC: 7.1 G/DL
RBC # BLD AUTO: 3.61 M/UL
SODIUM SERPL-SCNC: 139 MMOL/L
T4 FREE SERPL-MCNC: 1.09 NG/DL
TSH SERPL DL<=0.005 MIU/L-ACNC: 8.72 UIU/ML
WBC # BLD AUTO: 8.3 K/UL

## 2018-03-22 PROCEDURE — 12000002 HC ACUTE/MED SURGE SEMI-PRIVATE ROOM

## 2018-03-22 PROCEDURE — 99233 SBSQ HOSP IP/OBS HIGH 50: CPT | Mod: ,,, | Performed by: INTERNAL MEDICINE

## 2018-03-22 PROCEDURE — 63600175 PHARM REV CODE 636 W HCPCS: Performed by: NURSE PRACTITIONER

## 2018-03-22 PROCEDURE — 85025 COMPLETE CBC W/AUTO DIFF WBC: CPT

## 2018-03-22 PROCEDURE — 84100 ASSAY OF PHOSPHORUS: CPT

## 2018-03-22 PROCEDURE — 63600175 PHARM REV CODE 636 W HCPCS: Performed by: EMERGENCY MEDICINE

## 2018-03-22 PROCEDURE — 80053 COMPREHEN METABOLIC PANEL: CPT

## 2018-03-22 PROCEDURE — 25000003 PHARM REV CODE 250: Performed by: NURSE PRACTITIONER

## 2018-03-22 PROCEDURE — 25000003 PHARM REV CODE 250: Performed by: EMERGENCY MEDICINE

## 2018-03-22 PROCEDURE — 83735 ASSAY OF MAGNESIUM: CPT

## 2018-03-22 PROCEDURE — 36415 COLL VENOUS BLD VENIPUNCTURE: CPT

## 2018-03-22 PROCEDURE — 84439 ASSAY OF FREE THYROXINE: CPT

## 2018-03-22 PROCEDURE — 84443 ASSAY THYROID STIM HORMONE: CPT

## 2018-03-22 RX ORDER — THYROID 30 MG/1
60 TABLET ORAL
Status: DISCONTINUED | OUTPATIENT
Start: 2018-03-23 | End: 2018-03-22

## 2018-03-22 RX ORDER — LEVOTHYROXINE SODIUM 50 UG/1
50 TABLET ORAL
Status: DISCONTINUED | OUTPATIENT
Start: 2018-03-23 | End: 2018-03-24 | Stop reason: HOSPADM

## 2018-03-22 RX ADMIN — SODIUM CHLORIDE: 0.9 INJECTION, SOLUTION INTRAVENOUS at 05:03

## 2018-03-22 RX ADMIN — PANTOPRAZOLE SODIUM 40 MG: 40 TABLET, DELAYED RELEASE ORAL at 09:03

## 2018-03-22 RX ADMIN — STANDARDIZED SENNA CONCENTRATE AND DOCUSATE SODIUM 1 TABLET: 8.6; 5 TABLET, FILM COATED ORAL at 10:03

## 2018-03-22 RX ADMIN — ONDANSETRON HYDROCHLORIDE 4 MG: 2 INJECTION INTRAMUSCULAR; INTRAVENOUS at 05:03

## 2018-03-22 RX ADMIN — ONDANSETRON HYDROCHLORIDE 4 MG: 2 INJECTION INTRAMUSCULAR; INTRAVENOUS at 08:03

## 2018-03-22 RX ADMIN — STANDARDIZED SENNA CONCENTRATE AND DOCUSATE SODIUM 1 TABLET: 8.6; 5 TABLET, FILM COATED ORAL at 09:03

## 2018-03-22 RX ADMIN — SODIUM CHLORIDE: 0.9 INJECTION, SOLUTION INTRAVENOUS at 12:03

## 2018-03-22 RX ADMIN — LEVOTHYROXINE, LIOTHYRONINE 30 MG: 19; 4.5 TABLET ORAL at 05:03

## 2018-03-22 RX ADMIN — PIPERACILLIN AND TAZOBACTAM 4.5 G: 4; .5 INJECTION, POWDER, LYOPHILIZED, FOR SOLUTION INTRAVENOUS; PARENTERAL at 05:03

## 2018-03-22 RX ADMIN — KETOROLAC TROMETHAMINE 15 MG: 30 INJECTION, SOLUTION INTRAMUSCULAR at 09:03

## 2018-03-22 RX ADMIN — PIPERACILLIN AND TAZOBACTAM 4.5 G: 4; .5 INJECTION, POWDER, LYOPHILIZED, FOR SOLUTION INTRAVENOUS; PARENTERAL at 12:03

## 2018-03-22 RX ADMIN — PIPERACILLIN AND TAZOBACTAM 4.5 G: 4; .5 INJECTION, POWDER, LYOPHILIZED, FOR SOLUTION INTRAVENOUS; PARENTERAL at 09:03

## 2018-03-22 RX ADMIN — KETOROLAC TROMETHAMINE 15 MG: 30 INJECTION, SOLUTION INTRAMUSCULAR at 08:03

## 2018-03-22 RX ADMIN — KETOROLAC TROMETHAMINE 15 MG: 30 INJECTION, SOLUTION INTRAMUSCULAR at 01:03

## 2018-03-22 NOTE — HPI
Briseida Quiles is a 22 y.o. female with a hx of  Hypothyroidism, Mononucleosis, chronic constipation, and kidney stones.  She is admitted to the service of hospital medicine with severe sepsis.  She presented to the ED at the request of her PCP Bridget Blackburn with concerns for abnormal urinalysis in office.  Her PCP was concerned she had UTI and pyelonephritis 2/2 to ongoing symptoms of abdominal pain, nausea, urinary frequency, urgency, and dysuria x 2 weeks. She was recently on abx for bronchitis and Influenza. Pt is sexually active with one partner in the past 6 months. She denied vaginal discharge, diarrhea, vomiting, fever, chills, and diaphoresis.

## 2018-03-22 NOTE — PLAN OF CARE
Problem: Patient Care Overview  Goal: Plan of Care Review  Outcome: Ongoing (interventions implemented as appropriate)  AAOx4. Up ad rachel. Pain and nausea controlled with prn medications. IVF and atx infusing per order. Tele maintained. PRN stool softner admin. Pt stayed awake on laptop through the night. Mother at other at bedside. Q2 hour rounding utilized. Pain and comfort assessed. Bed low, brakes locked, SR up, call light within reach. POC reviewed. Pt verbalized understanding. Will continue to monitor.

## 2018-03-22 NOTE — PLAN OF CARE
Problem: Patient Care Overview  Goal: Plan of Care Review  Outcome: Ongoing (interventions implemented as appropriate)  Plan of care reviewed with patient she verbalized understanding admitted for pyelonephritis cont on IV ABT no ADR noted. Medicated for pain and nausea which both were effective. Cont on strict I/O's, Also on cardiac monitoring remains WNL. Pt remains free of falls or episodes call light within reach will cont to monitor.

## 2018-03-22 NOTE — PLAN OF CARE
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 She also lives with her parents here in [place]. She updated her cell number to [phone]. I updated [employer]. The pt denies HH or DME. She uses [facility] on [address]. She drives and reports independence in ADL's. PCP is Bridget Blackburn NP and insurance is [employer]. I educated the pt on the blue discharge folder and wrote my name and number on the pts white board. Ally Barber [facility]     03/22/18 1054   Discharge Assessment   Assessment Type Discharge Planning Assessment   Confirmed/corrected address and phone number on facesheet? Yes   Assessment information obtained from? Patient   Communicated expected length of stay with patient/caregiver no   Prior to hospitilization cognitive status: Alert/Oriented   Prior to hospitalization functional status: Independent   Current cognitive status: Alert/Oriented   Current Functional Status: Independent   Lives With alone;parent(s)   Able to Return to Prior Arrangements yes   Is patient able to care for self after discharge? Yes   Who are your caregiver(s) and their phone number(s)? David Bunch [phone]   Readmission Within The Last 30 Days no previous admission in last 30 days   Patient currently being followed by outpatient case management? No   Patient currently receives any other outside agency services? No   Equipment Currently Used at Home none   Do you have any problems affording any of your prescribed medications? No   Is the patient taking medications as prescribed? yes   Does the patient have transportation home? Yes   Transportation Available family or friend will provide   Does the patient receive services at the [facility]? No   Discharge Plan A Home   Discharge Plan B Home with family   Patient/Family In Agreement With Plan yes

## 2018-03-22 NOTE — PROGRESS NOTES
Progress Note  Hospital Medicine  Patient Name:Briseida Quiles  MRN:  [id number]  Patient Class: IP- Inpatient  Admit Date: 3/21/2018  Length of Stay: 1 days  Expected Discharge Date:   Attending Physician: Tasha Crump MD  Primary Care Provider:  Bridget Blackburn NP    SUBJECTIVE:     Principal Problem: Severe sepsis  Initial history of present illness: Briseida Quiles is a 22 y.o. female with a hx of  Hypothyroidism, Mononucleosis, chronic constipation, and kidney stones.  She is admitted to the service of hospital medicine with severe sepsis.  She presented to the ED at the request of her PCP Bridget Blackburn with concerns for abnormal urinalysis in office.  Her PCP was concerned she had UTI and pyelonephritis 2/2 to ongoing symptoms of abdominal pain, nausea, urinary frequency, urgency, and dysuria x 2 weeks. She was recently on abx for bronchitis and Influenza. Pt is sexually active with one partner in the past 6 months. She denied vaginal discharge, diarrhea, vomiting, fever, chills, and diaphoresis.    PMH/PSH/SH/FH/Meds: reviewed.    Symptoms/Review of Systems: Tmax 99.3F. No shortness of breath, cough, chest pain or headache, fever or abdominal pain.     Diet:  Adequate intake.    Activity level: Normal.    Pain:  Patient reports no pain.       OBJECTIVE:   Vital Signs (Most Recent):      Temp: 97.9 °F (36.6 °C) (03/22/18 0753)  Pulse: 76 (03/22/18 0753)  Resp: 18 (03/22/18 0753)  BP: 120/65 (03/22/18 0753)  SpO2: 100 % (03/22/18 0527)       Vital Signs Range (Last 24H):  Temp:  [97.9 °F (36.6 °C)-99.3 °F (37.4 °C)]   Pulse:  []   Resp:  [16-20]   BP: (115-146)/(57-84)   SpO2:  [95 %-100 %]     Weight: 53.7 kg (118 lb 6.2 oz)  Body mass index is 19.7 kg/m².    Intake/Output Summary (Last 24 hours) at 03/22/18 0914  Last data filed at 03/22/18 0645   Gross per 24 hour   Intake          1233.75 ml   Output              200 ml   Net          1033.75 ml     Physical Examination:  Constitutional: She is  oriented to person, place, and time. She appears well-developed and well-nourished. No distress.   HENT:   Head: Normocephalic and atraumatic.   Mouth/Throat: Oropharynx is clear and moist.   Eyes: Conjunctivae and EOM are normal. Pupils are equal, round, and reactive to light. No scleral icterus.   Neck: Normal range of motion. Neck supple. No JVD present. No tracheal deviation present. No thyromegaly present.   Cardiovascular: Normal rate, regular rhythm, normal heart sounds and intact distal pulses.  Exam reveals no gallop and no friction rub.    No murmur heard.  Pulses:       Dorsalis pedis pulses are 2+ on the right side, and 2+ on the left side.   Pulmonary/Chest: Effort normal and breath sounds normal. No accessory muscle usage. No respiratory distress. She has no wheezes. She has no rhonchi. She has no rales.   Abdominal: Soft. She exhibits no distension and no mass. NT, ND, bowel sounds audible.  Musculoskeletal: Normal range of motion. She exhibits no edema, tenderness or deformity.   Neurological: She is alert and oriented to person, place, and time. She has normal strength. She exhibits normal muscle tone. Coordination normal.   Skin: Skin is warm, dry and intact. Capillary refill takes less than 2 seconds. No rash noted. No erythema.   Psychiatric: She has a normal mood and affect. Her speech is normal and behavior is normal. Judgment and thought content normal.   Vitals reviewed.    CBC:    Recent Labs  Lab 03/21/18  1634 03/22/18  0607   WBC 15.30* 8.30   RBC 4.25 3.61*   HGB 12.9 11.1*   HCT 38.2 32.5*    224   MCV 90 90   MCH 30.3 30.7   MCHC 33.7 34.1   BMP    Recent Labs  Lab 03/21/18  1634 03/22/18  0607   GLU 99 102    139   K 4.0 3.8    108   CO2 22* 22*   BUN 12 8   CREATININE 0.7 0.8   CALCIUM 10.1 9.2   MG 2.3 2.1      Diagnostic Results:  Microbiology Results (last 7 days)     Procedure Component Value Units Date/Time    Blood culture [[id number]] Collected:  03/21/18  1634    Order Status:  Completed Specimen:  Blood from Antecubital, Right  Arm Updated:  03/22/18 0515     Blood Culture, Routine No Growth to date    Narrative:       Aerobic and anaerobic  IV draw right a/c    Blood culture [[id number]] Collected:  03/21/18 1634    Order Status:  Completed Specimen:  Blood from Antecubital, Left  Arm Updated:  03/22/18 0515     Blood Culture, Routine No Growth to date    Narrative:       Aerobic and anaerobic    Urine culture [[id number]] Collected:  03/21/18 1641    Order Status:  Sent Specimen:  Urine from Urine, Clean Catch Updated:  03/21/18 2108         Assessment/Plan:     * Severe sepsis     PCP office laboratory with abnormal urinalysis concerning for UTI/Pyelonephritis  Overall impression is severe sepsis  Antibiotics given-              Antibiotics      Start     Stop Route Frequency Ordered     03/22/18 0018   piperacillin-tazobactam 4.5 g in sodium chloride 0.9% 100 mL IVPB (ready to mix system)  (Beta-Lactam WITHOUT resistance or severe beta-lactam allergy)      03/29 0029 IV Every 8 hours (non-standard times) 03/21/18 1619          Recent Labs  Lab 03/21/18  1634 03/21/18  1914   LACTATE 3.5* 1.1     Organ dysfunction indicated by Lactic Acidosis.     Blood cultures obtained - follow results  Urinalysis and urine culture performed - follow culture results  Lactate level normalized.  Continue IV Zosyn  Antiemetics prn  Pain control   IV hydration          Pyelonephritis     See severe sepsis plan          Hypothyroidism     Chronic problem.  TSH is high. Patient is taking Armor thyroid. Only took for 2 week. Start PO Synthroid 50 mcg daily.     DVT prophylaxis: Use SCD and TEDs. Early ambulation encouraged.    Discussed with patient's mother and grandmother. Time spent in care of the patient, counseling and coordination of care (Greater than 50% spent in direct face to face contact): 35 min.      Tasha Crump MD  Department of Hospital Medicine   [facility]

## 2018-03-22 NOTE — ASSESSMENT & PLAN NOTE
Chronic problem.  Recent change of medications.   Will check TSH in am.  Continue home medication and adjust as necessary.

## 2018-03-22 NOTE — H&P
[facility] Medicine  History & Physical    Patient Name: Briseida Quiles  MRN: [id number]  Admission Date: 3/21/2018  Attending Physician: Tasha Crump MD   Primary Care Provider: Bridget Blackburn NP         Patient information was obtained from patient, parent and ER records.     Subjective:     Principal Problem:Severe sepsis    Chief Complaint:   Chief Complaint   Patient presents with    Abdominal Pain     x 3 weeks         HPI: Briseida Quiles is a 22 y.o. female with a hx of  Hypothyroidism, Mononucleosis, chronic constipation, and kidney stones.  She is admitted to the service of hospital medicine with severe sepsis.  She presented to the ED at the request of her PCP Bridget Blackburn with concerns for abnormal urinalysis in office.  Her PCP was concerned she had UTI and pyelonephritis 2/2 to ongoing symptoms of abdominal pain, nausea, urinary frequency, urgency, and dysuria x 2 weeks. She was recently on abx for bronchitis and Influenza. Pt is sexually active with one partner in the past 6 months. She denied vaginal discharge, diarrhea, vomiting, fever, chills, and diaphoresis.    Past Medical History:   Diagnosis Date    Mononucleosis 2011    RSV (acute bronchiolitis due to respiratory syncytial virus)        Past Surgical History:   Procedure Laterality Date    WISDOM TOOTH EXTRACTION         Review of patient's allergies indicates:  No Known Allergies    No current facility-administered medications on file prior to encounter.      Current Outpatient Prescriptions on File Prior to Encounter   Medication Sig    [DISCONTINUED] levothyroxine (SYNTHROID) 25 MCG tablet TK 1 T PO D     Family History     Problem Relation (Age of Onset)    Cancer Maternal Grandfather    Diabetes Maternal Grandmother, Maternal Grandfather    Heart disease Maternal Grandfather    Hyperlipidemia Paternal Grandmother    Hypertension Maternal Grandfather    Obesity Maternal Grandmother        Social  History Main Topics    Smoking status: Never Smoker    Smokeless tobacco: Never Used    Alcohol use Yes      Comment: last drink over a month, socially     Drug use: No    Sexual activity: No     Review of Systems   Constitutional: Negative for appetite change, chills, fatigue and fever.   HENT: Negative for hearing loss, postnasal drip, sore throat and trouble swallowing.    Eyes: Negative for photophobia and visual disturbance.   Respiratory: Negative for cough, shortness of breath and wheezing.    Cardiovascular: Negative for chest pain, palpitations and leg swelling.   Gastrointestinal: Positive for abdominal pain and nausea. Negative for diarrhea and vomiting.   Endocrine: Negative for polydipsia, polyphagia and polyuria.   Genitourinary: Positive for decreased urine volume, dysuria, frequency and urgency.   Musculoskeletal: Positive for back pain and myalgias. Negative for gait problem, neck pain and neck stiffness.   Skin: Negative for rash and wound.   Neurological: Negative for dizziness, syncope, weakness, numbness and headaches.   Hematological: Does not bruise/bleed easily.   Psychiatric/Behavioral: Negative for confusion. The patient is not nervous/anxious.      Objective:     Vital Signs (Most Recent):  Temp: 99.3 °F (37.4 °C) (03/21/18 2042)  Pulse: 107 (03/21/18 2042)  Resp: 16 (03/21/18 2042)  BP: 125/71 (03/21/18 2042)  SpO2: 100 % (03/21/18 2042) Vital Signs (24h Range):  Temp:  [98.1 °F (36.7 °C)-99.3 °F (37.4 °C)] 99.3 °F (37.4 °C)  Pulse:  [103-127] 107  Resp:  [16-20] 16  SpO2:  [95 %-100 %] 100 %  BP: (115-146)/(59-84) 125/71     Weight: 54.4 kg (120 lb)  Body mass index is 19.97 kg/m².    Physical Exam   Constitutional: She is oriented to person, place, and time. She appears well-developed and well-nourished. No distress.   HENT:   Head: Normocephalic and atraumatic.   Mouth/Throat: Oropharynx is clear and moist.   Eyes: Conjunctivae and EOM are normal. Pupils are equal, round, and  reactive to light. No scleral icterus.   Neck: Normal range of motion. Neck supple. No JVD present. No tracheal deviation present. No thyromegaly present.   Cardiovascular: Normal rate, regular rhythm, normal heart sounds and intact distal pulses.  Exam reveals no gallop and no friction rub.    No murmur heard.  Pulses:       Dorsalis pedis pulses are 2+ on the right side, and 2+ on the left side.   Pulmonary/Chest: Effort normal and breath sounds normal. No accessory muscle usage. No respiratory distress. She has no wheezes. She has no rhonchi. She has no rales.   Abdominal: Soft. She exhibits no distension and no mass. Bowel sounds are increased. There is tenderness in the right upper quadrant, right lower quadrant, suprapubic area and left upper quadrant. There is guarding (Voluntary) and CVA tenderness (Bilateral tenderness with percussion).   Musculoskeletal: Normal range of motion. She exhibits no edema, tenderness or deformity.   Neurological: She is alert and oriented to person, place, and time. She has normal strength. She exhibits normal muscle tone. Coordination normal.   Skin: Skin is warm, dry and intact. Capillary refill takes less than 2 seconds. No rash noted. No erythema.   Psychiatric: She has a normal mood and affect. Her speech is normal and behavior is normal. Judgment and thought content normal.   Vitals reviewed.        CRANIAL NERVES     CN III, IV, VI   Pupils are equal, round, and reactive to light.  Extraocular motions are normal.        Significant Labs:   CBC:   Recent Labs  Lab 03/21/18  1634   WBC 15.30*   HGB 12.9   HCT 38.2        CMP:   Recent Labs  Lab 03/21/18  1634      K 4.0      CO2 22*   GLU 99   BUN 12   CREATININE 0.7   CALCIUM 10.1   PROT 9.0*   ALBUMIN 4.8   BILITOT 0.5   ALKPHOS 77   AST 23   ALT 14   ANIONGAP 14   EGFRNONAA >60     Lactic Acid:   Recent Labs  Lab 03/21/18  1634 03/21/18  1914   LACTATE 3.5* 1.1     Magnesium:   Recent Labs  Lab  03/21/18  1634   MG 2.3     Urine Studies:   Recent Labs  Lab 03/21/18  1851   COLORU Other*  Other*   APPEARANCEUA Clear  Clear   PHUR 6.0  6.0   SPECGRAV 1.010  1.010   PROTEINUA Negative  Negative   GLUCUA Negative  Negative   KETONESU 1+*  1+*   BILIRUBINUA Negative  Negative   OCCULTUA Negative  Negative   NITRITE Negative  Negative   UROBILINOGEN Negative  Negative   LEUKOCYTESUR Negative  Negative       Significant Imaging: I have reviewed all pertinent imaging results/findings within the past 24 hours.   CT abdomen/pelvis: Impression       Small amount of fluid identified in the right cul-de-sac probably physiologic.  Prominent amount of stool and gas seen in the colon suggesting constipation.  Otherwise negative CT of the abdomen and pelvis.             Assessment/Plan:     * Severe sepsis    PCP office laboratory with abnormal urinalysis concerning for UTI/Pyelonephritis  Overall impression is severe sepsis  Antibiotics given-   Antibiotics     Start     Stop Route Frequency Ordered    03/22/18 0018  piperacillin-tazobactam 4.5 g in sodium chloride 0.9% 100 mL IVPB (ready to mix system)  (Beta-Lactam WITHOUT resistance or severe beta-lactam allergy)      03/29 0029 IV Every 8 hours (non-standard times) 03/21/18 1619          Recent Labs  Lab 03/21/18  1634 03/21/18  1914   LACTATE 3.5* 1.1       Organ dysfunction indicated by Lactic Acidosis.    Blood cultures obtained - follow results  Urinalysis and urine culture performed - follow culture results  Trend Lactate level  Continue IV Zosyn  Antiemetics prn  Pain control   IV hydration          Pyelonephritis    See severe sepsis plan          Hypothyroidism    Chronic problem.  Recent change of medications.   Will check TSH in am.  Continue home medication and adjust as necessary.            VTE Risk Mitigation         Ordered     IP VTE LOW RISK PATIENT  Once      03/21/18 2026             Letitia Cornejo NP  Department of Hospital Medicine    [facility]

## 2018-03-22 NOTE — ASSESSMENT & PLAN NOTE
PCP office laboratory with abnormal urinalysis concerning for UTI/Pyelonephritis  Overall impression is severe sepsis  Antibiotics given-   Antibiotics     Start     Stop Route Frequency Ordered    03/22/18 0018  piperacillin-tazobactam 4.5 g in sodium chloride 0.9% 100 mL IVPB (ready to mix system)  (Beta-Lactam WITHOUT resistance or severe beta-lactam allergy)      03/29 0029 IV Every 8 hours (non-standard times) 03/21/18 1619          Recent Labs  Lab 03/21/18  1634 03/21/18  1914   LACTATE 3.5* 1.1       Organ dysfunction indicated by Lactic Acidosis.    Blood cultures obtained - follow results  Urinalysis and urine culture performed - follow culture results  Trend Lactate level  Continue IV Zosyn  Antiemetics prn  Pain control   IV hydration

## 2018-03-22 NOTE — SUBJECTIVE & OBJECTIVE
Past Medical History:   Diagnosis Date    Mononucleosis 2011    RSV (acute bronchiolitis due to respiratory syncytial virus)        Past Surgical History:   Procedure Laterality Date    WISDOM TOOTH EXTRACTION         Review of patient's allergies indicates:  No Known Allergies    No current facility-administered medications on file prior to encounter.      Current Outpatient Prescriptions on File Prior to Encounter   Medication Sig    [DISCONTINUED] levothyroxine (SYNTHROID) 25 MCG tablet TK 1 T PO D     Family History     Problem Relation (Age of Onset)    Cancer Maternal Grandfather    Diabetes Maternal Grandmother, Maternal Grandfather    Heart disease Maternal Grandfather    Hyperlipidemia Paternal Grandmother    Hypertension Maternal Grandfather    Obesity Maternal Grandmother        Social History Main Topics    Smoking status: Never Smoker    Smokeless tobacco: Never Used    Alcohol use Yes      Comment: last drink over a month, socially     Drug use: No    Sexual activity: No     Review of Systems   Constitutional: Negative for appetite change, chills, fatigue and fever.   HENT: Negative for hearing loss, postnasal drip, sore throat and trouble swallowing.    Eyes: Negative for photophobia and visual disturbance.   Respiratory: Negative for cough, shortness of breath and wheezing.    Cardiovascular: Negative for chest pain, palpitations and leg swelling.   Gastrointestinal: Positive for abdominal pain and nausea. Negative for diarrhea and vomiting.   Endocrine: Negative for polydipsia, polyphagia and polyuria.   Genitourinary: Positive for decreased urine volume, dysuria, frequency and urgency.   Musculoskeletal: Positive for back pain and myalgias. Negative for gait problem, neck pain and neck stiffness.   Skin: Negative for rash and wound.   Neurological: Negative for dizziness, syncope, weakness, numbness and headaches.   Hematological: Does not bruise/bleed easily.   Psychiatric/Behavioral:  Negative for confusion. The patient is not nervous/anxious.      Objective:     Vital Signs (Most Recent):  Temp: 99.3 °F (37.4 °C) (03/21/18 2042)  Pulse: 107 (03/21/18 2042)  Resp: 16 (03/21/18 2042)  BP: 125/71 (03/21/18 2042)  SpO2: 100 % (03/21/18 2042) Vital Signs (24h Range):  Temp:  [98.1 °F (36.7 °C)-99.3 °F (37.4 °C)] 99.3 °F (37.4 °C)  Pulse:  [103-127] 107  Resp:  [16-20] 16  SpO2:  [95 %-100 %] 100 %  BP: (115-146)/(59-84) 125/71     Weight: 54.4 kg (120 lb)  Body mass index is 19.97 kg/m².    Physical Exam   Constitutional: She is oriented to person, place, and time. She appears well-developed and well-nourished. No distress.   HENT:   Head: Normocephalic and atraumatic.   Mouth/Throat: Oropharynx is clear and moist.   Eyes: Conjunctivae and EOM are normal. Pupils are equal, round, and reactive to light. No scleral icterus.   Neck: Normal range of motion. Neck supple. No JVD present. No tracheal deviation present. No thyromegaly present.   Cardiovascular: Normal rate, regular rhythm, normal heart sounds and intact distal pulses.  Exam reveals no gallop and no friction rub.    No murmur heard.  Pulses:       Dorsalis pedis pulses are 2+ on the right side, and 2+ on the left side.   Pulmonary/Chest: Effort normal and breath sounds normal. No accessory muscle usage. No respiratory distress. She has no wheezes. She has no rhonchi. She has no rales.   Abdominal: Soft. She exhibits no distension and no mass. Bowel sounds are increased. There is tenderness in the right upper quadrant, right lower quadrant, suprapubic area and left upper quadrant. There is guarding (Voluntary) and CVA tenderness (Bilateral tenderness with percussion).   Musculoskeletal: Normal range of motion. She exhibits no edema, tenderness or deformity.   Neurological: She is alert and oriented to person, place, and time. She has normal strength. She exhibits normal muscle tone. Coordination normal.   Skin: Skin is warm, dry and intact.  Capillary refill takes less than 2 seconds. No rash noted. No erythema.   Psychiatric: She has a normal mood and affect. Her speech is normal and behavior is normal. Judgment and thought content normal.   Vitals reviewed.        CRANIAL NERVES     CN III, IV, VI   Pupils are equal, round, and reactive to light.  Extraocular motions are normal.        Significant Labs:   CBC:   Recent Labs  Lab 03/21/18  1634   WBC 15.30*   HGB 12.9   HCT 38.2        CMP:   Recent Labs  Lab 03/21/18  1634      K 4.0      CO2 22*   GLU 99   BUN 12   CREATININE 0.7   CALCIUM 10.1   PROT 9.0*   ALBUMIN 4.8   BILITOT 0.5   ALKPHOS 77   AST 23   ALT 14   ANIONGAP 14   EGFRNONAA >60     Lactic Acid:   Recent Labs  Lab 03/21/18  1634 03/21/18  1914   LACTATE 3.5* 1.1     Magnesium:   Recent Labs  Lab 03/21/18  1634   MG 2.3     Urine Studies:   Recent Labs  Lab 03/21/18  1851   COLORU Other*  Other*   APPEARANCEUA Clear  Clear   PHUR 6.0  6.0   SPECGRAV 1.010  1.010   PROTEINUA Negative  Negative   GLUCUA Negative  Negative   KETONESU 1+*  1+*   BILIRUBINUA Negative  Negative   OCCULTUA Negative  Negative   NITRITE Negative  Negative   UROBILINOGEN Negative  Negative   LEUKOCYTESUR Negative  Negative       Significant Imaging: I have reviewed all pertinent imaging results/findings within the past 24 hours.   CT abdomen/pelvis: Impression       Small amount of fluid identified in the right cul-de-sac probably physiologic.  Prominent amount of stool and gas seen in the colon suggesting constipation.  Otherwise negative CT of the abdomen and pelvis.

## 2018-03-23 LAB
BASOPHILS # BLD AUTO: 0 K/UL
BASOPHILS NFR BLD: 0.4 %
DIFFERENTIAL METHOD: ABNORMAL
EOSINOPHIL # BLD AUTO: 0.1 K/UL
EOSINOPHIL NFR BLD: 2.2 %
ERYTHROCYTE [DISTWIDTH] IN BLOOD BY AUTOMATED COUNT: 13.7 %
HCT VFR BLD AUTO: 30.1 %
HGB BLD-MCNC: 10.1 G/DL
LYMPHOCYTES # BLD AUTO: 1.7 K/UL
LYMPHOCYTES NFR BLD: 29.8 %
MAGNESIUM SERPL-MCNC: 2.1 MG/DL
MCH RBC QN AUTO: 30.5 PG
MCHC RBC AUTO-ENTMCNC: 33.5 G/DL
MCV RBC AUTO: 91 FL
MONOCYTES # BLD AUTO: 0.7 K/UL
MONOCYTES NFR BLD: 11.8 %
NEUTROPHILS # BLD AUTO: 3.1 K/UL
NEUTROPHILS NFR BLD: 55.8 %
PHOSPHATE SERPL-MCNC: 4.4 MG/DL
PLATELET # BLD AUTO: 174 K/UL
PMV BLD AUTO: 8.4 FL
RBC # BLD AUTO: 3.3 M/UL
WBC # BLD AUTO: 5.6 K/UL

## 2018-03-23 PROCEDURE — 85025 COMPLETE CBC W/AUTO DIFF WBC: CPT

## 2018-03-23 PROCEDURE — 84100 ASSAY OF PHOSPHORUS: CPT

## 2018-03-23 PROCEDURE — 25000003 PHARM REV CODE 250: Performed by: EMERGENCY MEDICINE

## 2018-03-23 PROCEDURE — 83735 ASSAY OF MAGNESIUM: CPT

## 2018-03-23 PROCEDURE — 63600175 PHARM REV CODE 636 W HCPCS: Performed by: EMERGENCY MEDICINE

## 2018-03-23 PROCEDURE — 63600175 PHARM REV CODE 636 W HCPCS: Performed by: NURSE PRACTITIONER

## 2018-03-23 PROCEDURE — 25000003 PHARM REV CODE 250: Performed by: INTERNAL MEDICINE

## 2018-03-23 PROCEDURE — 36415 COLL VENOUS BLD VENIPUNCTURE: CPT

## 2018-03-23 PROCEDURE — 12000002 HC ACUTE/MED SURGE SEMI-PRIVATE ROOM

## 2018-03-23 PROCEDURE — 99232 SBSQ HOSP IP/OBS MODERATE 35: CPT | Mod: ,,, | Performed by: INTERNAL MEDICINE

## 2018-03-23 RX ADMIN — PIPERACILLIN AND TAZOBACTAM 4.5 G: 4; .5 INJECTION, POWDER, LYOPHILIZED, FOR SOLUTION INTRAVENOUS; PARENTERAL at 03:03

## 2018-03-23 RX ADMIN — PIPERACILLIN AND TAZOBACTAM 4.5 G: 4; .5 INJECTION, POWDER, LYOPHILIZED, FOR SOLUTION INTRAVENOUS; PARENTERAL at 01:03

## 2018-03-23 RX ADMIN — LEVOTHYROXINE SODIUM 50 MCG: 50 TABLET ORAL at 05:03

## 2018-03-23 RX ADMIN — PIPERACILLIN AND TAZOBACTAM 4.5 G: 4; .5 INJECTION, POWDER, LYOPHILIZED, FOR SOLUTION INTRAVENOUS; PARENTERAL at 11:03

## 2018-03-23 RX ADMIN — PIPERACILLIN AND TAZOBACTAM 4.5 G: 4; .5 INJECTION, POWDER, LYOPHILIZED, FOR SOLUTION INTRAVENOUS; PARENTERAL at 09:03

## 2018-03-23 RX ADMIN — KETOROLAC TROMETHAMINE 15 MG: 30 INJECTION, SOLUTION INTRAMUSCULAR at 11:03

## 2018-03-23 RX ADMIN — PANTOPRAZOLE SODIUM 40 MG: 40 TABLET, DELAYED RELEASE ORAL at 09:03

## 2018-03-23 NOTE — PLAN OF CARE
Problem: Patient Care Overview  Goal: Plan of Care Review  Outcome: Ongoing (interventions implemented as appropriate)  AAOx4. Up ad rachel. Pain controlled with prn medications. IVF and atx infusing per order. Tele maintained. Pt running jessica when sleeping. PRN stool softner admin. Pt stayed awake on laptop through the night. Mother at other at bedside. Q2 hour rounding utilized. Pain and comfort assessed. Bed low, brakes locked, SR up, call light within reach. POC reviewed. Pt verbalized understanding. Will continue to monitor.

## 2018-03-23 NOTE — NURSING
Monitor room notified of HR in 40s. Pt asleep. Vitals stable. NANNETTE Franco notified. Will continue to monitor.

## 2018-03-23 NOTE — PLAN OF CARE
Problem: Patient Care Overview  Goal: Plan of Care Review  Outcome: Ongoing (interventions implemented as appropriate)  Pt alert and oriented. No new symptoms. Denies pain. Denies nausea. VSS. Tele in place. Mom at bedside. Fluids infusing. Low vte. Antibiotics given. Bm x2 today. Safety intact. Call light in reach. Plan of care reviewed with pt.Will continue to monitor./

## 2018-03-23 NOTE — PROGRESS NOTES
Progress Note  Hospital Medicine  Patient Name:Briseida Quiles  MRN:  [id number]  Patient Class: IP- Inpatient  Admit Date: 3/21/2018  Length of Stay: 2 days  Expected Discharge Date:   Attending Physician: Tasha Crump MD  Primary Care Provider:  Bridget Blackburn NP    SUBJECTIVE:     Principal Problem: Severe sepsis  Initial history of present illness: Briseida Quiles is a 22 y.o. female with a hx of  Hypothyroidism, Mononucleosis, chronic constipation, and kidney stones.  She is admitted to the service of hospital medicine with severe sepsis.  She presented to the ED at the request of her PCP Bridget Blackburn with concerns for abnormal urinalysis in office.  Her PCP was concerned she had UTI and pyelonephritis 2/2 to ongoing symptoms of abdominal pain, nausea, urinary frequency, urgency, and dysuria x 2 weeks. She was recently on abx for bronchitis and Influenza. Pt is sexually active with one partner in the past 6 months. She denied vaginal discharge, diarrhea, vomiting, fever, chills, and diaphoresis.    PMH/PSH/SH/FH/Meds: reviewed.    Symptoms/Review of Systems: Tmax 99.3F. Multiple complaints, not feeling well. No shortness of breath, cough, chest pain or headache, fever or abdominal pain.     Diet:  Adequate intake.    Activity level: Normal.    Pain:  Patient reports no pain.       OBJECTIVE:   Vital Signs (Most Recent):      Temp: 98.2 °F (36.8 °C) (03/23/18 1100)  Pulse: 78 (03/23/18 1100)  Resp: 18 (03/23/18 1100)  BP: (!) 103/53 (03/23/18 1100)  SpO2: 100 % (03/23/18 1100)       Vital Signs Range (Last 24H):  Temp:  [96.4 °F (35.8 °C)-98.2 °F (36.8 °C)]   Pulse:  [54-78]   Resp:  [16-18]   BP: ()/(50-74)   SpO2:  [92 %-100 %]     Weight: 53.7 kg (118 lb 6.2 oz)  Body mass index is 19.7 kg/m².    Intake/Output Summary (Last 24 hours) at 03/23/18 1129  Last data filed at 03/23/18 0600   Gross per 24 hour   Intake          1991.67 ml   Output             1200 ml   Net           791.67 ml     Physical  Examination:  Constitutional: She is oriented to person, place, and time. She appears well-developed and well-nourished. No distress.   HENT:   Head: Normocephalic and atraumatic.   Mouth/Throat: Oropharynx is clear and moist.   Eyes: Conjunctivae and EOM are normal. Pupils are equal, round, and reactive to light. No scleral icterus.   Neck: Normal range of motion. Neck supple. No JVD present. No tracheal deviation present. No thyromegaly present.   Cardiovascular: Normal rate, regular rhythm, normal heart sounds and intact distal pulses.  Exam reveals no gallop and no friction rub.    No murmur heard.  Pulses:       Dorsalis pedis pulses are 2+ on the right side, and 2+ on the left side.   Pulmonary/Chest: Effort normal and breath sounds normal. No accessory muscle usage. No respiratory distress. She has no wheezes. She has no rhonchi. She has no rales.   Abdominal: Soft. She exhibits no distension and no mass. NT, ND, bowel sounds audible.  Musculoskeletal: Normal range of motion. She exhibits no edema, tenderness or deformity.   Neurological: She is alert and oriented to person, place, and time. She has normal strength. She exhibits normal muscle tone. Coordination normal.   Skin: Skin is warm, dry and intact. Capillary refill takes less than 2 seconds. No rash noted. No erythema.   Psychiatric: She has a normal mood and affect. Her speech is normal and behavior is normal. Judgment and thought content normal.   Vitals reviewed.    CBC:    Recent Labs  Lab 03/21/18  1634 03/22/18  0607 03/23/18  0500   WBC 15.30* 8.30 5.60   RBC 4.25 3.61* 3.30*   HGB 12.9 11.1* 10.1*   HCT 38.2 32.5* 30.1*    224 174   MCV 90 90 91   MCH 30.3 30.7 30.5   MCHC 33.7 34.1 33.5   BMP    Recent Labs  Lab 03/21/18  1634 03/22/18  0607 03/23/18  0500   GLU 99 102  --     139  --    K 4.0 3.8  --     108  --    CO2 22* 22*  --    BUN 12 8  --    CREATININE 0.7 0.8  --    CALCIUM 10.1 9.2  --    MG 2.3 2.1 2.1       Diagnostic Results:  Microbiology Results (last 7 days)     Procedure Component Value Units Date/Time    Urine culture [[id number]] Collected:  03/21/18 1641    Order Status:  Completed Specimen:  Urine from Urine, Clean Catch Updated:  03/22/18 2349     Urine Culture, Routine No significant growth    Blood culture [[id number]] Collected:  03/21/18 1634    Order Status:  Completed Specimen:  Blood from Antecubital, Right  Arm Updated:  03/22/18 2212     Blood Culture, Routine No Growth to date     Blood Culture, Routine No Growth to date    Narrative:       Aerobic and anaerobic  IV draw right a/c    Blood culture [[id number]] Collected:  03/21/18 1634    Order Status:  Completed Specimen:  Blood from Antecubital, Left  Arm Updated:  03/22/18 2212     Blood Culture, Routine No Growth to date     Blood Culture, Routine No Growth to date    Narrative:       Aerobic and anaerobic         Assessment/Plan:     * Severe sepsis     PCP office laboratory with abnormal urinalysis concerning for UTI/Pyelonephritis  Overall impression is severe sepsis  Antibiotics given-              Antibiotics      Start     Stop Route Frequency Ordered     03/22/18 0018   piperacillin-tazobactam 4.5 g in sodium chloride 0.9% 100 mL IVPB (ready to mix system)  (Beta-Lactam WITHOUT resistance or severe beta-lactam allergy)      03/29 0029 IV Every 8 hours (non-standard times) 03/21/18 1619          Recent Labs  Lab 03/21/18  1634 03/21/18  1914   LACTATE 3.5* 1.1     Organ dysfunction indicated by Lactic Acidosis.     Blood cultures obtained - follow results  Urinalysis and urine culture performed - follow culture results  Lactate level normalized.  Continue IV Zosyn  Antiemetics prn  Pain control   IV hydration          Pyelonephritis     See severe sepsis plan          Hypothyroidism     Chronic problem.  TSH is high. Patient is taking Armor thyroid. Only took for 2 week. Start PO Synthroid 50 mcg daily.     DVT prophylaxis: Use SCD and  Mahad. Early ambulation encouraged.    Discussed with patient's mother. DC home in AM.      Tasha Crump MD  Department of [facility] Medicine   [facility]

## 2018-03-24 VITALS
BODY MASS INDEX: 19.72 KG/M2 | WEIGHT: 118.38 LBS | SYSTOLIC BLOOD PRESSURE: 102 MMHG | TEMPERATURE: 98 F | HEIGHT: 65 IN | DIASTOLIC BLOOD PRESSURE: 56 MMHG | OXYGEN SATURATION: 100 % | HEART RATE: 66 BPM | RESPIRATION RATE: 16 BRPM

## 2018-03-24 LAB
ANION GAP SERPL CALC-SCNC: 6 MMOL/L
BASOPHILS # BLD AUTO: 0 K/UL
BASOPHILS NFR BLD: 0.6 %
BUN SERPL-MCNC: 10 MG/DL
CALCIUM SERPL-MCNC: 8.9 MG/DL
CHLORIDE SERPL-SCNC: 110 MMOL/L
CO2 SERPL-SCNC: 24 MMOL/L
CREAT SERPL-MCNC: 0.8 MG/DL
DIFFERENTIAL METHOD: ABNORMAL
EOSINOPHIL # BLD AUTO: 0.1 K/UL
EOSINOPHIL NFR BLD: 2.2 %
ERYTHROCYTE [DISTWIDTH] IN BLOOD BY AUTOMATED COUNT: 13.5 %
EST. GFR  (AFRICAN AMERICAN): >60 ML/MIN/1.73 M^2
EST. GFR  (NON AFRICAN AMERICAN): >60 ML/MIN/1.73 M^2
GLUCOSE SERPL-MCNC: 95 MG/DL
HCT VFR BLD AUTO: 28.1 %
HGB BLD-MCNC: 9.6 G/DL
LYMPHOCYTES # BLD AUTO: 2.1 K/UL
LYMPHOCYTES NFR BLD: 31.3 %
MAGNESIUM SERPL-MCNC: 2 MG/DL
MCH RBC QN AUTO: 30.9 PG
MCHC RBC AUTO-ENTMCNC: 34 G/DL
MCV RBC AUTO: 91 FL
MONOCYTES # BLD AUTO: 0.6 K/UL
MONOCYTES NFR BLD: 9 %
NEUTROPHILS # BLD AUTO: 3.7 K/UL
NEUTROPHILS NFR BLD: 56.9 %
PHOSPHATE SERPL-MCNC: 3.9 MG/DL
PLATELET # BLD AUTO: 156 K/UL
PMV BLD AUTO: 8.5 FL
POTASSIUM SERPL-SCNC: 4.1 MMOL/L
RBC # BLD AUTO: 3.1 M/UL
SODIUM SERPL-SCNC: 140 MMOL/L
WBC # BLD AUTO: 6.6 K/UL

## 2018-03-24 PROCEDURE — 99238 HOSP IP/OBS DSCHRG MGMT 30/<: CPT | Mod: ,,, | Performed by: INTERNAL MEDICINE

## 2018-03-24 PROCEDURE — 63600175 PHARM REV CODE 636 W HCPCS: Performed by: EMERGENCY MEDICINE

## 2018-03-24 PROCEDURE — 84100 ASSAY OF PHOSPHORUS: CPT

## 2018-03-24 PROCEDURE — 85025 COMPLETE CBC W/AUTO DIFF WBC: CPT

## 2018-03-24 PROCEDURE — 25000003 PHARM REV CODE 250: Performed by: INTERNAL MEDICINE

## 2018-03-24 PROCEDURE — 36415 COLL VENOUS BLD VENIPUNCTURE: CPT

## 2018-03-24 PROCEDURE — 80048 BASIC METABOLIC PNL TOTAL CA: CPT

## 2018-03-24 PROCEDURE — 25000003 PHARM REV CODE 250: Performed by: EMERGENCY MEDICINE

## 2018-03-24 PROCEDURE — 83735 ASSAY OF MAGNESIUM: CPT

## 2018-03-24 RX ORDER — LEVOTHYROXINE SODIUM 50 UG/1
50 TABLET ORAL
Qty: 30 TABLET | Refills: 1 | Status: SHIPPED | OUTPATIENT
Start: 2018-03-25 | End: 2019-03-25

## 2018-03-24 RX ORDER — ALPRAZOLAM 0.25 MG/1
0.25 TABLET ORAL ONCE
Status: DISCONTINUED | OUTPATIENT
Start: 2018-03-24 | End: 2018-03-24

## 2018-03-24 RX ORDER — METOPROLOL TARTRATE 1 MG/ML
5 INJECTION, SOLUTION INTRAVENOUS ONCE
Status: DISCONTINUED | OUTPATIENT
Start: 2018-03-24 | End: 2018-03-24

## 2018-03-24 RX ORDER — SULFAMETHOXAZOLE AND TRIMETHOPRIM 800; 160 MG/1; MG/1
1 TABLET ORAL 2 TIMES DAILY
Qty: 10 TABLET | Refills: 0 | Status: SHIPPED | OUTPATIENT
Start: 2018-03-24 | End: 2018-03-29

## 2018-03-24 RX ADMIN — PIPERACILLIN AND TAZOBACTAM 4.5 G: 4; .5 INJECTION, POWDER, LYOPHILIZED, FOR SOLUTION INTRAVENOUS; PARENTERAL at 09:03

## 2018-03-24 RX ADMIN — PANTOPRAZOLE SODIUM 40 MG: 40 TABLET, DELAYED RELEASE ORAL at 09:03

## 2018-03-24 RX ADMIN — LEVOTHYROXINE SODIUM 50 MCG: 50 TABLET ORAL at 05:03

## 2018-03-24 NOTE — NURSING
PIV removed; pressure and bandage applied. Telemetry monitor removed. Discharge instructions explained and given to patient and Mother. Verbalized understanding. Prescription with patient. Transported downstairs via wheelchair.

## 2018-03-24 NOTE — PLAN OF CARE
Problem: Patient Care Overview  Goal: Plan of Care Review  Outcome: Ongoing (interventions implemented as appropriate)  Awake, alert, and oriented. IV antibiotics infusing. VS stable. Remains afebrile throughout shift. Comfort level established. Patient voices no complaints of pain at this time. Bed in low position, brakes locked, side rails up x 2, call light w/in reach. Verbalized understanding of POC. Open communication facilitated. Will continue to monitor.

## 2018-03-24 NOTE — DISCHARGE SUMMARY
[facility] Hospital Medicine  Discharge Summary      Patient Name: Briseida Qulies  MRN: [id number]  Admission Date: 3/21/2018  Hospital Length of Stay: 3 days  Discharge Date and Time:  03/24/2018 2:53 PM  Attending Physician: Tasha Crump MD   Discharging Provider: Cosme Villareal MD  Primary Care Provider: Bridget Blackburn NP        HPI: Briseida Quiles is a 22 y.o. female with a hx of  Hypothyroidism, Mononucleosis, chronic constipation, and kidney stones.  She is admitted to the service of hospital medicine with severe sepsis.  She presented to the ED at the request of her PCP Bridget Blackburn with concerns for abnormal urinalysis in office.  Her PCP was concerned she had UTI and pyelonephritis 2/2 to ongoing symptoms of abdominal pain, nausea, urinary frequency, urgency, and dysuria x 2 weeks. She was recently on abx for bronchitis and Influenza. Pt is sexually active with one partner in the past 6 months. She denied vaginal discharge, diarrhea, vomiting, fever, chills, and diaphoresis.       * No surgery found *      Hospital Course: The patient was admitted and treated for urosepsis with fluids and Zosyn.  Her urine culture remained negative as did her blood cultures.  Given her symptoms of constipation and high TSH, she was started back on synthroid for previously diagnosed hypothyroidism and counseled on the importance of follow up for appropriate titration of her synthroid.  Otherwise, she remained stable and was discharged to complete a course of Bactrim    Physical Exam on day of discharge:  General: AAox3, NAD  CV: RRR, no M/R/G  Pulm:CTAB  Ex: no C/C/E    Consults:     Final Active Diagnoses:    Diagnosis Date Noted POA    PRINCIPAL PROBLEM:  Severe sepsis [A41.9, R65.20] 03/21/2018 Yes    Pyelonephritis [N12] 03/21/2018 Yes    Hypothyroidism [E03.9] 03/21/2018 Yes     Chronic      Problems Resolved During this Admission:    Diagnosis Date Noted Date Resolved POA      Discharged  Condition: stable    Disposition: home    Follow Up: with PCP in one week    Patient Instructions:   No discharge procedures on file.  Medications:  Reconciled Home Medications:   Discharge Medication List as of 3/24/2018  1:12 PM      START taking these medications    Details   levothyroxine (SYNTHROID) 50 MCG tablet Take 1 tablet (50 mcg total) by mouth before breakfast., Starting Sun 3/25/2018, Until Mon 3/25/2019, Print      sulfamethoxazole-trimethoprim 800-160mg (BACTRIM DS) 800-160 mg Tab Take 1 tablet by mouth 2 (two) times daily., Starting Sat 3/24/2018, Until Thu 3/29/2018, Print         STOP taking these medications       thyroid (ARMOUR THYROID) Tab Comments:   Reason for Stopping:               Significant Diagnostic Studies: Labs:   CBC   Recent Labs  Lab 03/23/18  0500 03/24/18  0450   WBC 5.60 6.60   HGB 10.1* 9.6*   HCT 30.1* 28.1*    156       Pending Diagnostic Studies:     None        Indwelling Lines/Drains at time of discharge:   Lines/Drains/Airways          No matching active lines, drains, or airways          Time spent on the discharge of patient: 25 minutes  Patient was seen and examined on the date of discharge and determined to be suitable for discharge.         Cosme Villareal MD  Department of Hospital Medicine  [facility]

## 2018-03-24 NOTE — DISCHARGE INSTRUCTIONS
Thank you for choosing [facility] for your medical care. The primary doctor who is taking care of you at the time of your discharge is Tasha Crump MD.     You were admitted to the hospital with Severe sepsis.     Please note your discharge instructions, including diet/activity restrictions, follow-up appointments, and medication changes.  If you have any questions about your medical issues, prescriptions, or any other questions, please feel free to contact the [facility] Medicine Dept at [phone] and we will help.    If you are previously with Home health, outpatient PT/OT or under a therapy program, you are cleared to return to those programs.    Please direct all long term medication refills and follow up to your primary care provider, Bridget Blackburn NP. Thank you again for letting us take care of your health care needs.    Please note the following discharge instructions per your discharging physician-  1. Complete course of Bactrim as instructed  2. Take miralax daily for constipation  3. Would recommend iron supplementation at 325 mg twice daily of ferrous sulfate  4. Follow up with PCP this week

## 2018-03-25 NOTE — PLAN OF CARE
03/25/18 0826   Final Note   Assessment Type Final Discharge Note   Discharge Disposition Home

## 2018-03-26 LAB
BACTERIA BLD CULT: NORMAL
BACTERIA BLD CULT: NORMAL

## 2018-03-28 ENCOUNTER — HOSPITAL ENCOUNTER (EMERGENCY)
Facility: HOSPITAL | Age: 23
Discharge: HOME | End: 2018-03-28
Attending: EMERGENCY MEDICINE
Payer: COMMERCIAL

## 2018-03-28 VITALS
RESPIRATION RATE: 18 BRPM | BODY MASS INDEX: 19.72 KG/M2 | HEIGHT: 65 IN | SYSTOLIC BLOOD PRESSURE: 112 MMHG | DIASTOLIC BLOOD PRESSURE: 63 MMHG | WEIGHT: 118.38 LBS | OXYGEN SATURATION: 99 % | TEMPERATURE: 97 F | HEART RATE: 90 BPM

## 2018-03-28 DIAGNOSIS — R10.9 ABDOMINAL PAIN, UNSPECIFIED ABDOMINAL LOCATION: Primary | ICD-10-CM

## 2018-03-28 DIAGNOSIS — N83.209 CYST OF OVARY, UNSPECIFIED LATERALITY: ICD-10-CM

## 2018-03-28 LAB
ALBUMIN SERPL BCP-MCNC: 5.1 G/DL
ALP SERPL-CCNC: 72 U/L
ALT SERPL W/O P-5'-P-CCNC: 46 U/L
ANION GAP SERPL CALC-SCNC: 12 MMOL/L
AST SERPL-CCNC: 35 U/L
B-HCG UR QL: NEGATIVE
BACTERIA GENITAL QL WET PREP: ABNORMAL
BASOPHILS # BLD AUTO: 0 K/UL
BASOPHILS NFR BLD: 0.2 %
BILIRUB SERPL-MCNC: 0.2 MG/DL
BILIRUB UR QL STRIP: NEGATIVE
BUN SERPL-MCNC: 14 MG/DL
CALCIUM SERPL-MCNC: 10.6 MG/DL
CHLORIDE SERPL-SCNC: 105 MMOL/L
CLARITY UR: CLEAR
CLUE CELLS VAG QL WET PREP: ABNORMAL
CO2 SERPL-SCNC: 21 MMOL/L
COLOR UR: YELLOW
CREAT SERPL-MCNC: 1 MG/DL
CTP QC/QA: YES
DIFFERENTIAL METHOD: ABNORMAL
EOSINOPHIL # BLD AUTO: 0 K/UL
EOSINOPHIL NFR BLD: 0.2 %
ERYTHROCYTE [DISTWIDTH] IN BLOOD BY AUTOMATED COUNT: 13.4 %
EST. GFR  (AFRICAN AMERICAN): >60 ML/MIN/1.73 M^2
EST. GFR  (NON AFRICAN AMERICAN): >60 ML/MIN/1.73 M^2
FILAMENT FUNGI VAG WET PREP-#/AREA: ABNORMAL
GLUCOSE SERPL-MCNC: 120 MG/DL
GLUCOSE UR QL STRIP: NEGATIVE
HCT VFR BLD AUTO: 41 %
HGB BLD-MCNC: 13.9 G/DL
HGB UR QL STRIP: NEGATIVE
KETONES UR QL STRIP: NEGATIVE
LEUKOCYTE ESTERASE UR QL STRIP: NEGATIVE
LIPASE SERPL-CCNC: 19 U/L
LYMPHOCYTES # BLD AUTO: 0.7 K/UL
LYMPHOCYTES NFR BLD: 5.6 %
MCH RBC QN AUTO: 30.6 PG
MCHC RBC AUTO-ENTMCNC: 33.9 G/DL
MCV RBC AUTO: 90 FL
MONOCYTES # BLD AUTO: 0.5 K/UL
MONOCYTES NFR BLD: 4.3 %
NEUTROPHILS # BLD AUTO: 11.4 K/UL
NEUTROPHILS NFR BLD: 89.7 %
NITRITE UR QL STRIP: NEGATIVE
PH UR STRIP: 5 [PH] (ref 5–8)
PLATELET # BLD AUTO: 276 K/UL
PLATELET BLD QL SMEAR: ABNORMAL
PMV BLD AUTO: 8 FL
POTASSIUM SERPL-SCNC: 3.9 MMOL/L
PROT SERPL-MCNC: 9.8 G/DL
PROT UR QL STRIP: NEGATIVE
RBC # BLD AUTO: 4.55 M/UL
SODIUM SERPL-SCNC: 138 MMOL/L
SP GR UR STRIP: <=1.005 (ref 1–1.03)
SPECIMEN SOURCE: ABNORMAL
T VAGINALIS GENITAL QL WET PREP: ABNORMAL
URN SPEC COLLECT METH UR: ABNORMAL
UROBILINOGEN UR STRIP-ACNC: NEGATIVE EU/DL
WBC # BLD AUTO: 12.7 K/UL
WBC #/AREA VAG WET PREP: ABNORMAL
YEAST GENITAL QL WET PREP: ABNORMAL

## 2018-03-28 PROCEDURE — 81025 URINE PREGNANCY TEST: CPT | Performed by: EMERGENCY MEDICINE

## 2018-03-28 PROCEDURE — 83690 ASSAY OF LIPASE: CPT

## 2018-03-28 PROCEDURE — 87491 CHLMYD TRACH DNA AMP PROBE: CPT

## 2018-03-28 PROCEDURE — 96374 THER/PROPH/DIAG INJ IV PUSH: CPT

## 2018-03-28 PROCEDURE — 99284 EMERGENCY DEPT VISIT MOD MDM: CPT | Mod: 25

## 2018-03-28 PROCEDURE — 25000003 PHARM REV CODE 250: Performed by: NURSE PRACTITIONER

## 2018-03-28 PROCEDURE — 87086 URINE CULTURE/COLONY COUNT: CPT

## 2018-03-28 PROCEDURE — 36415 COLL VENOUS BLD VENIPUNCTURE: CPT

## 2018-03-28 PROCEDURE — 87210 SMEAR WET MOUNT SALINE/INK: CPT

## 2018-03-28 PROCEDURE — 25500020 PHARM REV CODE 255

## 2018-03-28 PROCEDURE — 80053 COMPREHEN METABOLIC PANEL: CPT

## 2018-03-28 PROCEDURE — 85025 COMPLETE CBC W/AUTO DIFF WBC: CPT

## 2018-03-28 PROCEDURE — 63600175 PHARM REV CODE 636 W HCPCS: Performed by: EMERGENCY MEDICINE

## 2018-03-28 PROCEDURE — 96361 HYDRATE IV INFUSION ADD-ON: CPT

## 2018-03-28 PROCEDURE — 81003 URINALYSIS AUTO W/O SCOPE: CPT

## 2018-03-28 RX ORDER — SODIUM CHLORIDE 9 MG/ML
1000 INJECTION, SOLUTION INTRAVENOUS
Status: COMPLETED | OUTPATIENT
Start: 2018-03-28 | End: 2018-03-28

## 2018-03-28 RX ORDER — SODIUM CHLORIDE 9 MG/ML
INJECTION, SOLUTION INTRAVENOUS
Status: DISCONTINUED
Start: 2018-03-28 | End: 2018-03-28 | Stop reason: HOSPADM

## 2018-03-28 RX ORDER — PROMETHAZINE HYDROCHLORIDE 25 MG/1
25 SUPPOSITORY RECTAL EVERY 6 HOURS PRN
Qty: 5 SUPPOSITORY | Refills: 0 | Status: SHIPPED | OUTPATIENT
Start: 2018-03-28 | End: 2018-06-28 | Stop reason: ALTCHOICE

## 2018-03-28 RX ORDER — KETOROLAC TROMETHAMINE 30 MG/ML
15 INJECTION, SOLUTION INTRAMUSCULAR; INTRAVENOUS ONCE
Status: COMPLETED | OUTPATIENT
Start: 2018-03-28 | End: 2018-03-28

## 2018-03-28 RX ORDER — NAPROXEN 500 MG/1
500 TABLET ORAL 2 TIMES DAILY WITH MEALS
Qty: 60 TABLET | Refills: 0 | Status: SHIPPED | OUTPATIENT
Start: 2018-03-28 | End: 2018-06-28

## 2018-03-28 RX ORDER — KETOROLAC TROMETHAMINE 30 MG/ML
15 INJECTION, SOLUTION INTRAMUSCULAR; INTRAVENOUS
Status: DISCONTINUED | OUTPATIENT
Start: 2018-03-28 | End: 2018-03-28

## 2018-03-28 RX ORDER — ONDANSETRON 4 MG/1
4 TABLET, ORALLY DISINTEGRATING ORAL
Status: COMPLETED | OUTPATIENT
Start: 2018-03-28 | End: 2018-03-28

## 2018-03-28 RX ORDER — KETOROLAC TROMETHAMINE 30 MG/ML
10 INJECTION, SOLUTION INTRAMUSCULAR; INTRAVENOUS
Status: DISCONTINUED | OUTPATIENT
Start: 2018-03-28 | End: 2018-03-28

## 2018-03-28 RX ORDER — NAPROXEN 500 MG/1
500 TABLET ORAL 2 TIMES DAILY WITH MEALS
Qty: 60 TABLET | Refills: 0 | Status: SHIPPED | OUTPATIENT
Start: 2018-03-28 | End: 2018-03-28

## 2018-03-28 RX ADMIN — SODIUM CHLORIDE 1000 ML: 0.9 INJECTION, SOLUTION INTRAVENOUS at 04:03

## 2018-03-28 RX ADMIN — KETOROLAC TROMETHAMINE 15 MG: 30 INJECTION, SOLUTION INTRAMUSCULAR at 06:03

## 2018-03-28 RX ADMIN — ONDANSETRON 4 MG: 4 TABLET, ORALLY DISINTEGRATING ORAL at 05:03

## 2018-03-28 RX ADMIN — IOHEXOL 75 ML: 350 INJECTION, SOLUTION INTRAVENOUS at 05:03

## 2018-03-28 NOTE — ED PROVIDER NOTES
Encounter Date: 3/28/2018    SCRIBE #1 NOTE: I, Cosme Heredia, am scribing for, and in the presence of, Naheed SEO.       History     Chief Complaint   Patient presents with    Abdominal Pain     RLQ abdominal pain with bilat flank pain x month. Nausea, denies pain.       03/28/2018 4:25 PM     Chief complaint: abdominal pain       Briseida Quiles is a 22 y.o. female with a PMHx of mononucleosis, severe sepsis (03/21), and pyelonephritis (03/21) who presents to the ED with complaints of worsening abdominal pain with onset 4 weeks. Patient reports that she was discharged 03/24/2018 for urosepsis with negative urine and blood cultures prior to discharge. She was treated with IV antibiotics and bactrim. She was referred here by PCP after follow-up visit to rule out appendicitis. Patient reports that she is having RLQ abdominal pain that is sharp and tender when she presses on it. Patient states that this pain has worsened overnight. She states that it radiates into her bilateral flanks. She endorses nausea with this pain. She admits that while in the hospital she had a work-up for this pain, that was benign. Patient admits that she is still having dysuria with this abdominal pain. She describes it as a burning. Patient endorses diarrhea secondary to antibiotics. Patient does admit to a decreased appetite since being discharged. She does admit that she is currently on her menstrual cycle. She denies having intercourse recently or unprotected intercourse. Patient did have a relatively benign UA at the PCP's office today. Patient denies fever, vomiting, vaginal discharge, vaginal pain, and headache.                    The history is provided by the patient.     Review of patient's allergies indicates:  No Known Allergies  Past Medical History:   Diagnosis Date    Mononucleosis 2011    RSV (acute bronchiolitis due to respiratory syncytial virus)      Past Surgical History:   Procedure Laterality Date    LAUREN  TOOTH EXTRACTION       Family History   Problem Relation Age of Onset    Diabetes Maternal Grandmother     Obesity Maternal Grandmother     Heart disease Maternal Grandfather     Diabetes Maternal Grandfather     Hypertension Maternal Grandfather     Cancer Maternal Grandfather     Hyperlipidemia Paternal Grandmother     ADD / ADHD Neg Hx     Alcohol abuse Neg Hx     Allergies Neg Hx     Asthma Neg Hx     Autism spectrum disorder Neg Hx     Behavior problems Neg Hx     Birth defects Neg Hx     Chromosomal disorder Neg Hx     Cleft lip Neg Hx     Congenital heart disease Neg Hx     Depression Neg Hx     Early death Neg Hx     Eczema Neg Hx     Hearing loss Neg Hx     Kidney disease Neg Hx     Learning disabilities Neg Hx     Mental illness Neg Hx     Migraines Neg Hx     Neurodegenerative disease Neg Hx     Seizures Neg Hx     SIDS Neg Hx     Thyroid disease Neg Hx     Other Neg Hx      Social History   Substance Use Topics    Smoking status: Never Smoker    Smokeless tobacco: Never Used    Alcohol use Yes      Comment: last drink over a month, socially      Review of Systems   Constitutional: Negative for fever.   HENT: Negative for sore throat.    Respiratory: Negative for shortness of breath.    Cardiovascular: Negative for chest pain.   Gastrointestinal: Positive for abdominal pain, diarrhea and nausea. Negative for abdominal distention.   Genitourinary: Positive for dysuria and flank pain. Negative for vaginal discharge and vaginal pain.   Musculoskeletal: Negative for back pain.   Skin: Negative for rash.   Neurological: Negative for weakness and headaches.   Hematological: Does not bruise/bleed easily.   All other systems reviewed and are negative.      Physical Exam     Initial Vitals [03/28/18 1445]   BP Pulse Resp Temp SpO2   121/60 (!) 116 14 99.3 °F (37.4 °C) 100 %      MAP       80.33         Physical Exam    Nursing note and vitals reviewed.  Constitutional: Vital signs  are normal. She appears well-developed and well-nourished.   HENT:   Head: Normocephalic and atraumatic.   Eyes: Pupils are equal, round, and reactive to light.   Neck: Neck supple.   Cardiovascular: Normal rate, regular rhythm, normal heart sounds and intact distal pulses. Exam reveals no gallop and no friction rub.    No murmur heard.  Pulmonary/Chest: Breath sounds normal. She has no wheezes. She has no rhonchi. She has no rales.   Abdominal: Soft. Normal appearance and bowel sounds are normal. She exhibits no distension. There is no hepatosplenomegaly. There is tenderness in the right lower quadrant. There is CVA tenderness and tenderness at McBurney's point. There is no rigidity, no rebound, no guarding and negative Rhodes's sign. No hernia. Hernia confirmed negative in the right inguinal area and confirmed negative in the left inguinal area.   Genitourinary: Vagina normal. Pelvic exam was performed with patient supine. No labial fusion. There is no rash, tenderness, lesion or injury on the right labia. There is no rash, tenderness, lesion or injury on the left labia. No vaginal discharge found.   Lymphadenopathy:        Right: No inguinal adenopathy present.        Left: No inguinal adenopathy present.   Neurological: She is alert and oriented to person, place, and time. She has normal strength.   Skin: Skin is warm, dry and intact.   Psychiatric: She has a normal mood and affect. Her speech is normal and behavior is normal.         ED Course   Procedures  Labs Reviewed   URINALYSIS - Abnormal; Notable for the following:        Result Value    Specific Gravity, UA <=1.005 (*)     All other components within normal limits   CBC W/ AUTO DIFFERENTIAL - Abnormal; Notable for the following:     MPV 8.0 (*)     Gran # (ANC) 11.4 (*)     Lymph # 0.7 (*)     Gran% 89.7 (*)     Lymph% 5.6 (*)     All other components within normal limits   COMPREHENSIVE METABOLIC PANEL - Abnormal; Notable for the following:     CO2 21  (*)     Glucose 120 (*)     Calcium 10.6 (*)     Total Protein 9.8 (*)     ALT 46 (*)     All other components within normal limits   VAGINAL SCREEN - Abnormal; Notable for the following:     Bacteria - Vaginal Screen Occasional (*)     All other components within normal limits   C. TRACHOMATIS/N. GONORRHOEAE BY AMP DNA   CULTURE, URINE   LIPASE   POCT URINE PREGNANCY        Imaging Results          CT Abdomen Pelvis With Contrast (Final result)  Result time 03/28/18 17:36:17    Final result by Jesus Grier MD (03/28/18 17:36:17)                 Impression:      No acute findings in the abdomen or pelvis to explain this patient's symptoms.  No significant interval change when compared to 03/21/2018.      Electronically signed by: Jesus Grier MD  Date:    03/28/2018  Time:    17:36             Narrative:    EXAMINATION:  CT ABDOMEN PELVIS WITH CONTRAST    CLINICAL HISTORY:  Infection, abdomen-pelvis;RLQ pain, appendicitis suspected;    TECHNIQUE:  Axial 5 mm images are reviewed from above the diaphragm to the proximal femora following the administration of 75 mL of Omnipaque 350 intravenously.  Coronal and sagittal reconstructions are reviewed.    COMPARISON:  03/21/2018    FINDINGS:  The visualized lung bases are clear.    The liver is unremarkable.  The gallbladder is unremarkable.  The spleen, bilateral adrenal glands and pancreas are unremarkable.  No dilated bile ducts are noted.    The kidneys enhance symmetrically.  No hydroureteronephrosis or nephroureterolithiasis is identified.    The stomach is nondistended.  No dilated loops of small bowel are seen.  No pneumatosis or pneumoperitoneum is seen.  The appendix is visualized.  It is normal in diameter.  No periappendiceal fluid or gas noted.  Moderate volume of scattered colonic fecal material is seen throughout the colon.    Uterus and adnexa appear normal for age.  The urinary bladder is mildly well distended and otherwise unremarkable.    No  abdominal or pelvic lymphadenopathy is seen.  The aorta is non aneurysmal.    Bone islands are seen in the pelvis and right proximal femur.  No acute osseous abnormality is seen.                                   Medical Decision Making:   History:   Old Medical Records: I decided to obtain old medical records.  Differential Diagnosis:   Appendicitis   pyelonephritis   nephrolithiasis  TOA  PID  Clinical Tests:   Lab Tests: Ordered and Reviewed  Radiological Study: Ordered and Reviewed       APC / Resident Notes:   Patient is a 22 y.o. female who presents to the ED 03/29/2018 underwent emergent evaluation for right lower quadrant pain has been present for 4 weeks.  Of note patient had a recent admission for sepsis and polynephritis.  Today patient is not septic or toxic appearing.  Patient is afebrile.  Vital signs normal.  Patient is currently taking Bactrim.  Urinalysis is unremarkable; do not think UTI.  Do not think pyelonephritis.  CT of abdomen and pelvis without acute findings.  Do not think nephrolithiasis.  Do not think acute appendicitis.  Pelvic exam without CMT or adnexal tenderness.  Do not think PID based on PE.  Labs are unremarkable.  No leukocytosis.  Patient normal; do not think pancreatitis.  Patient is eating and drinking well.  No signs of dehydration. STD testing pending; I'll will not treat prophylactically today.  UPT negative; do not think ectopic pregnancy. I do not suspect TOA or torsion.  Patient has been diagnosed with ovarian cyst by her OB/GYN and was instructed to start birth control at the end of this for treatment of her symptoms.  Patient treated with ibuprofen and antiemetic in ED with resolution of symptoms.  Prescription for anti-inflammatories and antiemetics given. Based on my clinical evaluation, I do not appreciate any immediate, emergent, or life threatening condition or etiology that warrants additional workup today and feel that the patient can be discharged with close  follow up care. Case discussed with Dr. Arthur who is agreeable to plan of care. Follow up and return precautions discussed; patient verbalized understanding and is agreeable to plan of care. Patient discharged home in stable condition.                Scribe Attestation:   Scribe #1: I performed the above scribed service and the documentation accurately describes the services I performed. I attest to the accuracy of the note.    Attending Attestation:     Physician Attestation Statement for NP/PA:   I have conducted a face to face encounter with this patient in addition to the NP/PA, due to Medical Complexity    Other NP/PA Attestation Additions:      Medical Decision Makin-year-old female who presents with right lower quadrant abdominal pain, history of recent UTI, dehydration with elevated lactic acidosis.  Patient is currently on Bactrim.  CT scan was unremarkable.  Doubt appendicitis or acute surgical abdominal pathology.  Pelvic exam per GLO was also unremarkable.  Overall impression is abdominal pain.  Continue current care.  Follow-up with PCP within one week or return to the ED for any immediate concerns.       Physician Attestation for Scribe:  Physician Attestation Statement for Scribe #1: I, Naheed Doss, reviewed documentation, as scribed by in my presence, and it is both accurate and complete.     Comments: I, Naheed Doss NP-C, personally performed the services described in this documentation. All medical record entries made by the scribe were at my direction and in my presence.  I have reviewed the chart and agree that the record reflects my personal performance and is accurate and complete. RAYRAY Marx.  4:04 PM 2018                  Clinical Impression:   The primary encounter diagnosis was Abdominal pain, unspecified abdominal location. A diagnosis of Cyst of ovary, unspecified laterality was also pertinent to this visit.    Disposition:   Disposition: Discharged  Condition:  Stable                        Naheed Doss, JOSE  03/29/18 1603

## 2018-03-28 NOTE — ED PROVIDER NOTES
Encounter Date: 3/28/2018       History     Chief Complaint   Patient presents with    Abdominal Pain     RLQ abdominal pain with bilat flank pain x month. Nausea, denies pain.     HPI  Review of patient's allergies indicates:  No Known Allergies  Past Medical History:   Diagnosis Date    Mononucleosis 2011    RSV (acute bronchiolitis due to respiratory syncytial virus)      Past Surgical History:   Procedure Laterality Date    WISDOM TOOTH EXTRACTION       Family History   Problem Relation Age of Onset    Diabetes Maternal Grandmother     Obesity Maternal Grandmother     Heart disease Maternal Grandfather     Diabetes Maternal Grandfather     Hypertension Maternal Grandfather     Cancer Maternal Grandfather     Hyperlipidemia Paternal Grandmother     ADD / ADHD Neg Hx     Alcohol abuse Neg Hx     Allergies Neg Hx     Asthma Neg Hx     Autism spectrum disorder Neg Hx     Behavior problems Neg Hx     Birth defects Neg Hx     Chromosomal disorder Neg Hx     Cleft lip Neg Hx     Congenital heart disease Neg Hx     Depression Neg Hx     Early death Neg Hx     Eczema Neg Hx     Hearing loss Neg Hx     Kidney disease Neg Hx     Learning disabilities Neg Hx     Mental illness Neg Hx     Migraines Neg Hx     Neurodegenerative disease Neg Hx     Seizures Neg Hx     SIDS Neg Hx     Thyroid disease Neg Hx     Other Neg Hx      Social History   Substance Use Topics    Smoking status: Never Smoker    Smokeless tobacco: Never Used    Alcohol use Yes      Comment: last drink over a month, socially      Review of Systems    Physical Exam     Initial Vitals [03/28/18 1445]   BP Pulse Resp Temp SpO2   121/60 (!) 116 14 99.3 °F (37.4 °C) 100 %      MAP       80.33         Physical Exam    ED Course   Procedures  Labs Reviewed   URINALYSIS - Abnormal; Notable for the following:        Result Value    Specific Gravity, UA <=1.005 (*)     All other components within normal limits   CBC W/ AUTO  DIFFERENTIAL   COMPREHENSIVE METABOLIC PANEL   LIPASE   POCT URINE PREGNANCY                                  Clinical Impression:   {Add your Clinical Impression here. If you haven't documented one yet, please pend the note, finalize a Clinical Impression, and refresh your note before signing.:[id number]}

## 2018-03-29 LAB
C TRACH DNA SPEC QL NAA+PROBE: NOT DETECTED
N GONORRHOEA DNA SPEC QL NAA+PROBE: NOT DETECTED

## 2018-03-30 ENCOUNTER — NURSE TRIAGE (OUTPATIENT)
Dept: ADMINISTRATIVE | Facility: CLINIC | Age: 23
End: 2018-03-30

## 2018-03-30 LAB — BACTERIA UR CULT: NORMAL

## 2018-03-30 NOTE — TELEPHONE ENCOUNTER
Reason for Disposition   [1] Recent hospitalization AND [2] symptoms WORSE   MODERATE pain (e.g., interferes with normal activities or awakens from sleep)    Protocols used: ST INFECTION ON ANTIBIOTIC FOLLOW-UP CALL-A-AH, ST FLANK PAIN-A-AH    Pt states she was recently admitted to hospital for sepsis d/t pyelonephritis.  Pt states that this morning she began having increased flank pain.  Pt states she was seen at  and prescribed cipro this morning. Pt states she is unsure of UC treatment plan. Pt states flank pain has improved since UC visit this am. Pt triaged per symptoms, advised per protocol, and verbalizes understanding.

## 2018-05-22 ENCOUNTER — OFFICE VISIT (OUTPATIENT)
Dept: UROLOGY | Facility: CLINIC | Age: 23
End: 2018-05-22
Payer: COMMERCIAL

## 2018-05-22 ENCOUNTER — APPOINTMENT (OUTPATIENT)
Dept: LAB | Facility: HOSPITAL | Age: 23
End: 2018-05-22
Attending: NURSE PRACTITIONER
Payer: COMMERCIAL

## 2018-05-22 ENCOUNTER — HOSPITAL ENCOUNTER (OUTPATIENT)
Dept: RADIOLOGY | Facility: CLINIC | Age: 23
Discharge: HOME | End: 2018-05-22
Attending: NURSE PRACTITIONER
Payer: COMMERCIAL

## 2018-05-22 VITALS
HEART RATE: 84 BPM | BODY MASS INDEX: 19.17 KG/M2 | HEIGHT: 65 IN | WEIGHT: 115.06 LBS | SYSTOLIC BLOOD PRESSURE: 117 MMHG | DIASTOLIC BLOOD PRESSURE: 79 MMHG | TEMPERATURE: 98 F

## 2018-05-22 DIAGNOSIS — R39.89 BLADDER PAIN: ICD-10-CM

## 2018-05-22 DIAGNOSIS — R39.14 FEELING OF INCOMPLETE BLADDER EMPTYING: ICD-10-CM

## 2018-05-22 DIAGNOSIS — R39.89 BLADDER PAIN: Primary | ICD-10-CM

## 2018-05-22 DIAGNOSIS — R35.1 NOCTURIA: ICD-10-CM

## 2018-05-22 DIAGNOSIS — N39.0 RECURRENT UTI (URINARY TRACT INFECTION): ICD-10-CM

## 2018-05-22 LAB
BACTERIA #/AREA URNS HPF: NORMAL /HPF
BILIRUB SERPL-MCNC: NEGATIVE MG/DL
BLOOD URINE, POC: NORMAL
COLOR, POC UA: NORMAL
GLUCOSE UR QL STRIP: NORMAL
KETONES UR QL STRIP: NEGATIVE
LEUKOCYTE ESTERASE URINE, POC: NEGATIVE
MICROSCOPIC COMMENT: NORMAL
NITRITE, POC UA: NEGATIVE
PH, POC UA: 6
POC RESIDUAL URINE VOLUME: 63 ML (ref 0–100)
PROTEIN, POC: NEGATIVE
RBC #/AREA URNS HPF: 0 /HPF (ref 0–4)
SPECIFIC GRAVITY, POC UA: 1
SQUAMOUS #/AREA URNS HPF: 0 /HPF
UROBILINOGEN, POC UA: NORMAL
WBC #/AREA URNS HPF: 1 /HPF (ref 0–5)

## 2018-05-22 PROCEDURE — 81000 URINALYSIS NONAUTO W/SCOPE: CPT

## 2018-05-22 PROCEDURE — 76770 US EXAM ABDO BACK WALL COMP: CPT | Mod: TC,PO

## 2018-05-22 PROCEDURE — 99214 OFFICE O/P EST MOD 30 MIN: CPT | Mod: 25,S$GLB,, | Performed by: NURSE PRACTITIONER

## 2018-05-22 PROCEDURE — 81001 URINALYSIS AUTO W/SCOPE: CPT | Mod: S$GLB,,, | Performed by: NURSE PRACTITIONER

## 2018-05-22 PROCEDURE — 87086 URINE CULTURE/COLONY COUNT: CPT

## 2018-05-22 PROCEDURE — 99999 PR PBB SHADOW E&M-EST. PATIENT-LVL V: CPT | Mod: PBBFAC,,, | Performed by: NURSE PRACTITIONER

## 2018-05-22 PROCEDURE — 76770 US EXAM ABDO BACK WALL COMP: CPT | Mod: 26,,, | Performed by: RADIOLOGY

## 2018-05-22 PROCEDURE — 51701 INSERT BLADDER CATHETER: CPT | Mod: S$GLB,,, | Performed by: NURSE PRACTITIONER

## 2018-05-22 PROCEDURE — 51798 US URINE CAPACITY MEASURE: CPT | Mod: S$GLB,,, | Performed by: NURSE PRACTITIONER

## 2018-05-22 RX ORDER — FLUOXETINE 10 MG/1
TABLET ORAL
Refills: 1 | COMMUNITY
Start: 2018-05-10 | End: 2019-06-14

## 2018-05-22 RX ORDER — TAMSULOSIN HYDROCHLORIDE 0.4 MG/1
0.4 CAPSULE ORAL DAILY
Qty: 30 CAPSULE | Refills: 3 | Status: SHIPPED | OUTPATIENT
Start: 2018-05-22 | End: 2018-06-28 | Stop reason: ALTCHOICE

## 2018-05-22 RX ORDER — HYDROXYZINE PAMOATE 25 MG/1
CAPSULE ORAL
Refills: 3 | COMMUNITY
Start: 2018-04-03 | End: 2019-06-14

## 2018-05-22 RX ORDER — CIPROFLOXACIN 500 MG/1
TABLET ORAL
Refills: 0 | COMMUNITY
Start: 2018-03-30 | End: 2018-06-28

## 2018-05-22 RX ORDER — DICYCLOMINE HYDROCHLORIDE 10 MG/1
CAPSULE ORAL
Refills: 3 | COMMUNITY
Start: 2018-05-07 | End: 2019-06-14

## 2018-05-22 RX ORDER — LACTULOSE 10 G/15ML
SOLUTION ORAL; RECTAL
Refills: 3 | COMMUNITY
Start: 2018-05-14 | End: 2019-06-14

## 2018-05-22 NOTE — PROGRESS NOTES
"[facility] Note  Staff: LAMIN FieldsC    PCP: Azam Blackburn    Chief Complaint: Kidney and bladder pain    Subjective:        HPI: Briseida Quiles is a 22 y.o. female presents with feelings of incomplete emptying, bilateral kidney pain, nocturia 3-4x nightly, with frequency every one hour or more.  No hematuria noted by pt.  Pt and mother in office today state that pt was diagnosed with "sepsis" due to UTI March of 2018, but reviewing all hospital records today, ALL CT scans from Urology standpoint showed normal findings and urine culture was no growth done on 03/28/18.      Yesterday pt went to local Urgent Care for severe bladder and kidney pain and was given Cipro 500 mg BID x 5 days and Pyridium prn pain, but these meds currently have not helped her symptoms.  She quit taking Pyridium after one dose yesterday because made her nausea worse.    The patient was last seen by Dr. Aguirre on 09/05/2017 for hx of bilateral flank pain, urinary frequency, urgency, some incontinence.  LAST OV NOTE FROM MD:  (Pt initially saw her pcp  about a month ago for bilateral flank pain and urinary frequency, urgency, some incontinence. She was treated with abx bid x 1 week but her sx did not improve. She feels like her bladder was not emptying. They obtained an ultrasound 8/23/17 of her kidneys which some mild swelling on her left side and an xray which showed possible stone in left ureter. She was then seen by azam on 8/28/17 with similar complaints. Urine that day was negative for blood or infection. She ordered a ct renal stone study which showed no stones, no hydro and large amount of stool in her colon.      She returns today stating she still having feelings of incomplete empyting, no flank pain. Frequency q1 hour. Some urethral pain today. +urgency occasionally. No urge incotinence in the past few weeks.     She has a h/o significant constipation since 7th grade " (didn't have a bm for a month), had a workup. Since then she has a bm every other day to one x week and it's hard stool. Had tried to take stool softener. Tried colace for a few days but was concerned about.      Has had sx of uti's for many years similar to what she has now. Has had abx 1-2x before. Has never seen a urologist before. Never urologic issues as a kidney. Potty trained at 18 months. No bedwetting. No coffee, tea or coke. Drinks 3 bottles of water a day.)     Gf with h/o stones     ECOG Status: 0     G0, P 0  Sexually active - does not think sx ass'd with intercoruse.    REVIEW OF SYSTEMS:  Review of Systems   Constitutional: Negative for chills, diaphoresis, fever and weight loss.   HENT: Negative for congestion, hearing loss, nosebleeds and sore throat.    Eyes: Negative for blurred vision and pain.   Respiratory: Negative for cough and wheezing.    Cardiovascular: Negative for chest pain, palpitations and leg swelling.   Gastrointestinal: Positive for constipation. Negative for abdominal pain, heartburn, nausea and vomiting.        +IBS   Genitourinary: Positive for flank pain, frequency and urgency. Negative for dysuria and hematuria.   Musculoskeletal: Negative for back pain, joint pain, myalgias and neck pain.   Skin: Negative for itching and rash.   Neurological: Negative for dizziness, tremors, sensory change, seizures, loss of consciousness, weakness and headaches.   Endo/Heme/Allergies: Does not bruise/bleed easily.   Psychiatric/Behavioral: Negative for depression and suicidal ideas. The patient is nervous/anxious.      PMHx:  Past Medical History:   Diagnosis Date    Anxiety     IBS (irritable colon syndrome)     Mononucleosis 2011    RSV (acute bronchiolitis due to respiratory syncytial virus)     Thyroid disease      PSHx:  Past Surgical History:   Procedure Laterality Date    WISDOM TOOTH EXTRACTION       Stents/Valves/Foreign Bodies: No  Cardiac Evaluation: No     Screening  Studies  Colonoscopy: none     Fam Hx:   malignancies: No , gyn malignancies: Yes - maternal GGM with breast cancer .   kidney stones: Yes - maternal gF with stones      Soc Hx:  No tobacco  occ alcohol  Lives in , parents in [place]  :unmarried  Children: no kids  Occupation: [employer] at [employer]    Social History     Social History    Marital status: Single     Spouse name: N/A    Number of children: N/A    Years of education: N/A     Social History Main Topics    Smoking status: Never Smoker    Smokeless tobacco: Never Used    Alcohol use Yes      Comment: last drink over a month, socially     Drug use: No    Sexual activity: No     Other Topics Concern    None     Social History Narrative    Going to college 2015/2016 at [employer] for Pre-Med     Allergies:  Patient has no known allergies.    Medications: reviewed   Anticoagulation: No    Objective:     Vitals:    05/22/18 1332   BP: 117/79   Pulse: 84   Temp: 98.1 °F (36.7 °C)       Physical Exam   Vitals reviewed.  Constitutional: She is oriented to person, place, and time. She appears well-developed and well-nourished.   HENT:   Head: Normocephalic and atraumatic.   Eyes: Conjunctivae and EOM are normal. Pupils are equal, round, and reactive to light.   Neck: Normal range of motion. Neck supple.   Cardiovascular: Normal rate, regular rhythm, normal heart sounds and intact distal pulses.    Pulmonary/Chest: Effort normal and breath sounds normal.   Abdominal: Soft. Bowel sounds are normal.   Musculoskeletal: Normal range of motion.   Neurological: She is alert and oriented to person, place, and time. She has normal reflexes.   Skin: Skin is warm and dry.     Psychiatric: She has a normal mood and affect. Her behavior is normal. Judgment and thought content normal.     PVR by bladder scan performed by MA in office today: 63 mL with pt c/o severe pain during scan, therefore she was advised and prepped for  in and out cath for accuracy of urine  "residual.     Exam:  External Genitalia: normal hair distribution, no lesions  Urethral meatus: normal without prolapse or caruncle  Urethra: +Minimal tenderness, no mass observed.  Bladder: +Fullness, +Tenderness  Anus and perineum: appear normal  In and out cath performed by me in office with over 140 mL of residual collected.  Pt felt overall relief of her severe "kidney pain" after we emptied her bladder.    LABS REVIEW:  UA today:   Color:Clear, Yellow  Spec. Grav.  1.005  PH  6.0  Trace of Blood    Cr:   Lab Results   Component Value Date    CREATININE 1.0 03/28/2018     Assessment:       1. Bladder pain    2. Recurrent UTI (urinary tract infection)    3. Feeling of incomplete bladder emptying    4. Nocturia          Plan:   Incomplete bladder emptying vs. IC:    UA Micro and culture to be performed.  Renal US to be performed for worsening of symptoms.  Pt will be placed on trial of Tamsulosin 0.4 mg one tablet daily in evening to see if this help  (She is to start this med once Ultrasound is completed, but not before US)    F/u Dr. Aguirre for possible cystoscopy-rule out stricture vs. Other.    [facility]: Active    Bridget Kaiser, FNP-C  "

## 2018-05-24 LAB — BACTERIA UR CULT: NO GROWTH

## 2018-06-03 ENCOUNTER — PATIENT MESSAGE (OUTPATIENT)
Dept: UROLOGY | Facility: CLINIC | Age: 23
End: 2018-06-03

## 2018-06-28 ENCOUNTER — OFFICE VISIT (OUTPATIENT)
Dept: UROLOGY | Facility: CLINIC | Age: 23
End: 2018-06-28
Payer: COMMERCIAL

## 2018-06-28 ENCOUNTER — APPOINTMENT (OUTPATIENT)
Dept: LAB | Facility: HOSPITAL | Age: 23
End: 2018-06-28
Attending: UROLOGY
Payer: COMMERCIAL

## 2018-06-28 VITALS
TEMPERATURE: 98 F | WEIGHT: 119.38 LBS | DIASTOLIC BLOOD PRESSURE: 65 MMHG | HEIGHT: 65 IN | SYSTOLIC BLOOD PRESSURE: 102 MMHG | HEART RATE: 61 BPM | BODY MASS INDEX: 19.89 KG/M2

## 2018-06-28 DIAGNOSIS — R35.0 FREQUENCY OF MICTURITION: ICD-10-CM

## 2018-06-28 DIAGNOSIS — N39.8 DYSFUNCTIONAL VOIDING OF URINE: Primary | ICD-10-CM

## 2018-06-28 LAB
BILIRUB SERPL-MCNC: ABNORMAL MG/DL
BILIRUB UR QL STRIP: NEGATIVE
BLOOD URINE, POC: 250
CLARITY UR: CLEAR
COLOR UR: YELLOW
COLOR, POC UA: ABNORMAL
GLUCOSE UR QL STRIP: ABNORMAL
GLUCOSE UR QL STRIP: NEGATIVE
HGB UR QL STRIP: ABNORMAL
KETONES UR QL STRIP: ABNORMAL
KETONES UR QL STRIP: NEGATIVE
LEUKOCYTE ESTERASE UR QL STRIP: NEGATIVE
LEUKOCYTE ESTERASE URINE, POC: ABNORMAL
NITRITE UR QL STRIP: NEGATIVE
NITRITE, POC UA: ABNORMAL
PH UR STRIP: 6 [PH] (ref 5–8)
PH, POC UA: 6
PROT UR QL STRIP: NEGATIVE
PROTEIN, POC: ABNORMAL
SP GR UR STRIP: 1.01 (ref 1–1.03)
SPECIFIC GRAVITY, POC UA: 1015
URN SPEC COLLECT METH UR: ABNORMAL
UROBILINOGEN UR STRIP-ACNC: NEGATIVE EU/DL
UROBILINOGEN, POC UA: ABNORMAL

## 2018-06-28 PROCEDURE — 81002 URINALYSIS NONAUTO W/O SCOPE: CPT | Mod: S$GLB,,, | Performed by: UROLOGY

## 2018-06-28 PROCEDURE — 81003 URINALYSIS AUTO W/O SCOPE: CPT

## 2018-06-28 PROCEDURE — 51701 INSERT BLADDER CATHETER: CPT | Mod: S$GLB,,, | Performed by: UROLOGY

## 2018-06-28 PROCEDURE — 99999 PR PBB SHADOW E&M-EST. PATIENT-LVL IV: CPT | Mod: PBBFAC,,, | Performed by: UROLOGY

## 2018-06-28 PROCEDURE — 99215 OFFICE O/P EST HI 40 MIN: CPT | Mod: 25,S$GLB,, | Performed by: UROLOGY

## 2018-06-28 RX ORDER — FLUTICASONE PROPIONATE 50 MCG
SPRAY, SUSPENSION (ML) NASAL
Refills: 0 | COMMUNITY
Start: 2018-05-29 | End: 2019-06-14

## 2018-06-28 RX ORDER — PHENAZOPYRIDINE HYDROCHLORIDE 200 MG/1
TABLET, FILM COATED ORAL
Refills: 0 | COMMUNITY
Start: 2018-05-21 | End: 2018-06-28 | Stop reason: ALTCHOICE

## 2018-06-28 RX ORDER — DICYCLOMINE HYDROCHLORIDE 10 MG/1
CAPSULE ORAL
COMMUNITY
End: 2019-06-14

## 2018-06-28 RX ORDER — LORATADINE 10 MG/1
TABLET ORAL
Refills: 0 | COMMUNITY
Start: 2018-05-29 | End: 2019-06-14

## 2018-06-28 NOTE — PROGRESS NOTES
"[facility] Note - [place]   Staff: MD Carla  PCP: Dr.Melissa Watson [facility]: active    Chief Complaint: hydronephrosis and urinary discomfort    Subjective:        HPI: Briseida Quiles is a 23 y.o. female presents with       She was seen by me in sept 2017 for  bilateral flank pain and urinary frequency, urgency, some incontinence. She was treated with abx bid x 1 week but her sx did not improve. She feels like her bladder was not emptying. They obtained an ultrasound 8/23/17 of her kidneys which some mild swelling on her left side and an xray which showed possible stone in left ureter. She was then seen by azam on 8/28/17 with similar complaints. Urine that day was negative for blood or infection. She ordered a ct renal stone study which showed no stones, no hydro and large amount of stool in her colon. When she saw me a week after ct still c/o feelings of incomplete empyting, no flank pain. Frequency q1 hour. Some urethral pain today. +urgency occasionally. No urge incotinence in the past few weeks.    She had a h/o significant constipation since 7th grade (didn't have a bm for a month), had a workup. Since then she has a bm every other day to one x week and it's hard stool. Had tried to take stool softener. Tried colace for a few days but was concerned about. I recommended she take miralax day but she didn't. Between sept and march had persistent frequency.    Then in march 2018 she went to ER for severe abdominal pain at request of her pcp who did a dipstick "positive". However in the ER who urinalysis was negative (did not receive any abx prior) and culture was negative. Another ct was done showing no stones, no swelling and signficant constipation. She did have a wbc of 15 and lactic acid of 3.5. She was admitted for "pyelonephritis" although nothing really supported this. Her frequency was not new and her urinalysis was negative. She did have flank pain which has " occurred previously and not necessarily associated with kidneys. She received abx and was discharged. She has since GI who started her on linzess and diagnosed her with IBS-C. And she has now been having daily bm's but actually on miralax, which she has continued.     However she still has sx of urgency and frequency up to 20x a day. When she was seen by azam a residual was 140cc. And a rbus was normal. Again urine negative.     Has had sx of uti's for many years similar to what she has now. Has had abx 1-2x before. Has never seen a urologist before. Never urologic issues as a kidney. Potty trained at 18 months. No bedwetting. No coffee, tea or coke. Drinks 6 bottles of water a day.     Gf with h/o stones    UA void: 1.0.15/6/tr leuk/2+protein/250 blood (menstrual cycle)  UCx:   5/22/18 Ng, void: neg (on cipro for neg UA at ), residual was 140cc.  3/28/18 Ng, void: neg (c/o back pain)  3/21/18 Ng, void: neg - no infection, (c/o back pain)  2/26/13 S.saprophytic  Review of previous ua's only show blood 5/6/09    ECOG Status: 0    G0, P 0  Sexually active - does not think sx ass'd with intercoruse.    REVIEW OF SYSTEMS:  General ROS: no fevers, no chills  Psychological ROS: no depression  Endocrine ROS: no heat or cold  Respiratory ROS: no SOB  Cardiovascular ROS: + CP, recently diagnosed with levothryoxine  Gastrointestinal ROS: no abdominal pain, + constipation, no diarrhea, noBRBPR  Musculoskeletal ROS: no muscle pain  Neurological ROS: no headaches  Dermatological ROS: + rashes assd' with new medicine use  HEENT: no glasses, no sinus   ROS: per HPI     PMHx:  Past Medical History:   Diagnosis Date    Anxiety     IBS (irritable colon syndrome)     Mononucleosis 2011    RSV (acute bronchiolitis due to respiratory syncytial virus)     Thyroid disease    hypothyroid  Kidney stones: Yes - possibly?     PSHx:  Past Surgical History:   Procedure Laterality Date    WISDOM TOOTH EXTRACTION       Urologic or  Gynecologic Surgery: none    Stents/Valves/Foreign Bodies: No  Cardiac Evaluation: No    Screening Studies  Colonoscopy: none    Fam Hx:   malignancies: No , gyn malignancies: Yes - maternal GGM with breast cancer .   kidney stones: Yes - maternal gF with stones     Soc Hx:  No tobacco  occ alcohol  Lives in , parents in [place]  :unmarried  Children: no kids  Occupation: brant at [employer]    Allergies:  Patient has no known allergies.    Medications: reviewed   Anticoagulation: No    Objective:     Vitals:    06/28/18 1410   BP: 102/65   Pulse: 61   Temp: 98.4 °F (36.9 °C)     General:WDWN in NAD  Eyes: PERRLA, normal conjunctiva  Respiratory: no increased work on breathing, clear to auscultation  Cardiovascular: regular rate and rhythm. No obvious extremity edema.  GI: no palpation of masses. No tenderness. No hepatosplenomegaly to palpation.  Musculoskeletal: normal range of motion of bilateral upper extremities. Normal muscle strength and tone.  Skin: no obvious rashes or lesions. No tightening of skin noted.  Neurologic: CN grossly normal. Normal sensation.   Psychiatric: awake, alert and oriented x 3. Mood and affect normal. Cooperative.    Pelvic exam  Very tight levators  In and out cath by me with 5cc residual - sent for urinalysis (may see blood bc on cycle)    LABS REVIEW:      Cr:   Lab Results   Component Value Date    CREATININE 1.0 03/28/2018       PATHOLOGY REVIEW:  none    RADIOGRAPHIC REVIEW:  rbus 5/22/18   Normal kidneys without evidence of hydronephrosis or significant residual urine in post void bladder.    ctap with 3/28/18  No stones  No hydro  Distended bladder  Significant constipation      Ctrss 8/28/17  Small amount of free fluid identified in the right cul-de-sac otherwise negative CT of the abdomen and pelvis.  No evidence of kidney stones or hydronephrosis.    Colon appears to be filled with stool slightly anterior to bladder  5cm colon with stool.     rbus 8/23/17 dis, will  load images  Mild hydro in left kidney  Right kidney normal    kub 8/23/17 dis  Possible left mid ureteral calculus   Other findings: slight congential scoliosis      Assessment:       1. Dysfunctional voiding of urine    2. Frequency of micturition        Plan:     She has very tight levators and frequency of mictrutition that persists despite treatment of constipation. I suspect her prolonged and lifelong constipation resulted in frequency and dysfunctional voiding and has persisted now due to tightening of the pelvic floors over time from this.     As such I recommend that she undergo pelvic floor physical therapy. She is a great candidate for this. She has tight levators, frequency, feelings of incomplete emptying (pvr only 5cc today) and dyspareunia. I have referred her to [facility]. She is in agreement with plan. I wrote her for baclofen/valium/lidocaine suppositories if not able to tolerate PT.    I also explained what contaminated urine samples are and when she should actually be treated with abx. We went over this in great detail. I let her know she did not have pyelonephritis when she was admitted with wbc 15, lactic acid of 3, abdominal pain and completley negative ua and culture. I do not know what may have caused this.     Extensive review of previous ER records, nurse records, labs and imaging today.    F/u in 3months    I spent 60 minutes with the patient of which more than half was spent in direct consultation with the patient in regards to our treatment and plan.      Shwetha Aguirre MD

## 2018-06-29 ENCOUNTER — TELEPHONE (OUTPATIENT)
Dept: UROLOGY | Facility: CLINIC | Age: 23
End: 2018-06-29

## 2018-06-29 NOTE — TELEPHONE ENCOUNTER
----- Message from Shwetha Aguirre MD sent at 6/28/2018  5:33 PM CDT -----  dont forget to send copy of note to [facility]

## 2019-06-14 ENCOUNTER — OFFICE VISIT (OUTPATIENT)
Dept: UROLOGY | Facility: CLINIC | Age: 24
End: 2019-06-14
Payer: COMMERCIAL

## 2019-06-14 ENCOUNTER — APPOINTMENT (OUTPATIENT)
Dept: LAB | Facility: HOSPITAL | Age: 24
End: 2019-06-14
Attending: NURSE PRACTITIONER
Payer: COMMERCIAL

## 2019-06-14 VITALS
TEMPERATURE: 98 F | DIASTOLIC BLOOD PRESSURE: 75 MMHG | BODY MASS INDEX: 19.54 KG/M2 | RESPIRATION RATE: 18 BRPM | SYSTOLIC BLOOD PRESSURE: 109 MMHG | HEART RATE: 70 BPM | HEIGHT: 65 IN | WEIGHT: 117.31 LBS

## 2019-06-14 DIAGNOSIS — R39.15 URINARY URGENCY: ICD-10-CM

## 2019-06-14 DIAGNOSIS — N39.8 DYSFUNCTIONAL VOIDING OF URINE: ICD-10-CM

## 2019-06-14 DIAGNOSIS — R39.89 BLADDER PAIN: Primary | ICD-10-CM

## 2019-06-14 DIAGNOSIS — R10.2 PELVIC PAIN: ICD-10-CM

## 2019-06-14 LAB
BILIRUB SERPL-MCNC: ABNORMAL MG/DL
BILIRUB UR QL STRIP: NEGATIVE
BLOOD URINE, POC: ABNORMAL
CLARITY UR: CLEAR
COLOR UR: YELLOW
COLOR, POC UA: YELLOW
GLUCOSE UR QL STRIP: ABNORMAL
GLUCOSE UR QL STRIP: NEGATIVE
HGB UR QL STRIP: NEGATIVE
KETONES UR QL STRIP: ABNORMAL
KETONES UR QL STRIP: NEGATIVE
LEUKOCYTE ESTERASE UR QL STRIP: NEGATIVE
LEUKOCYTE ESTERASE URINE, POC: ABNORMAL
MICROSCOPIC COMMENT: NORMAL
NITRITE UR QL STRIP: NEGATIVE
NITRITE, POC UA: ABNORMAL
PH UR STRIP: 7 [PH] (ref 5–8)
PH, POC UA: 5.5
PROT UR QL STRIP: NEGATIVE
PROTEIN, POC: ABNORMAL
SP GR UR STRIP: 1.01 (ref 1–1.03)
SPECIFIC GRAVITY, POC UA: 1.02
SQUAMOUS #/AREA URNS HPF: 3 /HPF
URN SPEC COLLECT METH UR: NORMAL
UROBILINOGEN UR STRIP-ACNC: NEGATIVE EU/DL
UROBILINOGEN, POC UA: 0.2
WBC #/AREA URNS HPF: 1 /HPF (ref 0–5)

## 2019-06-14 PROCEDURE — 81002 POCT URINE DIPSTICK WITHOUT MICROSCOPE: ICD-10-PCS | Mod: S$GLB,,, | Performed by: NURSE PRACTITIONER

## 2019-06-14 PROCEDURE — 81002 URINALYSIS NONAUTO W/O SCOPE: CPT | Mod: S$GLB,,, | Performed by: NURSE PRACTITIONER

## 2019-06-14 PROCEDURE — 51701 INSERT BLADDER CATHETER: CPT | Mod: S$GLB,,, | Performed by: NURSE PRACTITIONER

## 2019-06-14 PROCEDURE — 81000 URINALYSIS NONAUTO W/SCOPE: CPT

## 2019-06-14 PROCEDURE — 99999 PR PBB SHADOW E&M-EST. PATIENT-LVL IV: CPT | Mod: PBBFAC,,, | Performed by: NURSE PRACTITIONER

## 2019-06-14 PROCEDURE — 81003 URINALYSIS AUTO W/O SCOPE: CPT

## 2019-06-14 PROCEDURE — 99999 PR PBB SHADOW E&M-EST. PATIENT-LVL IV: ICD-10-PCS | Mod: PBBFAC,,, | Performed by: NURSE PRACTITIONER

## 2019-06-14 PROCEDURE — 99214 PR OFFICE/OUTPT VISIT, EST, LEVL IV, 30-39 MIN: ICD-10-PCS | Mod: 25,S$GLB,, | Performed by: NURSE PRACTITIONER

## 2019-06-14 PROCEDURE — 99214 OFFICE O/P EST MOD 30 MIN: CPT | Mod: 25,S$GLB,, | Performed by: NURSE PRACTITIONER

## 2019-06-14 PROCEDURE — 87086 URINE CULTURE/COLONY COUNT: CPT

## 2019-06-14 PROCEDURE — 51701 PR INSERTION OF NON-INDWELLING BLADDER CATHETERIZATION FOR RESIDUAL UR: ICD-10-PCS | Mod: S$GLB,,, | Performed by: NURSE PRACTITIONER

## 2019-06-14 RX ORDER — SERTRALINE HYDROCHLORIDE 25 MG/1
TABLET, FILM COATED ORAL
Refills: 0 | COMMUNITY
Start: 2019-04-29

## 2019-06-14 RX ORDER — LEVOTHYROXINE SODIUM 75 UG/1
TABLET ORAL
Refills: 3 | COMMUNITY
Start: 2019-05-10

## 2019-06-14 NOTE — PROGRESS NOTES
[facility] Note  Staff: LAMIN FieldsC    PCP: Dr. Bridget Blackburn    Chief Complaint: Bladder pain    Subjective:        HPI: Briseida Quiles is a 24 y.o. female presents with pelvic/bladder discomfort x three weeks and here today for further evaluation of her symptoms.  Pt's mother in office with pt during visit today.  Pt currently denies dysuria, hematuria or problems with urination.    The pt was last seen by Dr. Shwetha Aguirre on 6/28/18.  Since last ov, pt underwent pelvic floor physical therapy due to severe levator ani tenderness and tightening of muscles and completed 3 months of PT.  Stopped afterwards due to cost of visits were 100.00 per visit per pt's insurance.  Pt does state today the PT did help improve her LUTS at that time.  Pt was suppose to f/up with Carla after she completed PT for re-examination and never returned to see MD.    PT'S  HX:  She was seen by Carla in sept 2017 for  bilateral flank pain and urinary frequency, urgency, some incontinence. She was treated with abx bid x 1 week but her sx did not improve. She felt like her bladder was not emptying. They obtained an ultrasound 8/23/17 of her kidneys which some mild swelling on her left side and an xray which showed possible stone in left ureter. She was then seen by me on 8/28/17 with similar complaints. Urine that day was negative for blood or infection. She ordered a ct renal stone study which showed no stones, no hydro and large amount of stool in her colon. When she saw MD a week after ct still c/o feelings of incomplete empyting, no flank pain. Frequency q1 hour. Some urethral pain today. +urgency occasionally. No urge incotinence in the past few weeks.     She had a h/o significant constipation since 7th grade (didn't have a bm for a month), had a workup. Since then she has a bm every other day to one x week and it's hard stool. Had tried to take stool softener. Tried colace for a  "few days but was concerned about. I recommended she take miralax day but she didn't. Between sept and march had persistent frequency.     Then in march 2018 she went to ER for severe abdominal pain at request of her pcp who did a dipstick "positive". However in the ER who urinalysis was negative (did not receive any abx prior) and culture was negative. Another ct was done showing no stones, no swelling and signficant constipation. She did have a wbc of 15 and lactic acid of 3.5. She was admitted for "pyelonephritis" although nothing really supported this. Her frequency was not new and her urinalysis was negative. She did have flank pain which has occurred previously and not necessarily associated with kidneys. She received abx and was discharged. She has since GI who started her on linzess and diagnosed her with IBS-C. And she has now been having daily bm's but actually on miralax, which she has continued.      However she still has sx of urgency and frequency up to 20x a day. When she was seen by azam a residual was 140cc. And a rbus was normal. Again urine negative.      Has had sx of uti's for many years similar to what she has now. Has had abx 1-2x before. Has never seen a urologist before. Never urologic issues as a kidney. Potty trained at 18 months. No bedwetting. No coffee, tea or coke. Drinks 6 bottles of water a day.      Gf with h/o stones     UCx:   5/22/18            Ng, void: neg (on cipro for neg UA at ), residual was 140cc.  3/28/18            Ng, void: neg (c/o back pain)  3/21/18            Ng, void: neg - no infection, (c/o back pain)  2/26/13            S.saprophytic  Review of previous ua's only show blood 5/6/09     ECOG Status: 0     G0, P 0  Sexually active - does not think sx ass'd with intercoruse.     REVIEW OF SYSTEMS:  General ROS: no fevers, no chills  Psychological ROS: no depression  Endocrine ROS: no heat or cold  Respiratory ROS: no SOB  Cardiovascular ROS: + CP, recently " diagnosed with levothryoxine  Gastrointestinal ROS: no abdominal pain, + constipation, no diarrhea, noBRBPR  Musculoskeletal ROS: no muscle pain  Neurological ROS: no headaches  Dermatological ROS: no rashes noted  HEENT: no glasses, no sinus   ROS: per HPI     PMHx:  Past Medical History:   Diagnosis Date    Anxiety     IBS (irritable colon syndrome)     Mononucleosis 2011    RSV (acute bronchiolitis due to respiratory syncytial virus)     Thyroid disease      Kidney stones: Yes - Possibly??    PSHx:  Past Surgical History:   Procedure Laterality Date    WISDOM TOOTH EXTRACTION       Stents/Valves/Foreign Bodies: No  Cardiac Evaluation: No    Screening Studies  Colonoscopy: None    Fam Hx:   malignancies: No , gyn malignancies: Yes - maternal GGM with breast cancer .   kidney stones: Yes - maternal gF with stones     Allergies:  Patient has no known allergies.    Medications: reviewed   Anticoagulation: No    Objective:     Vitals:    06/14/19 0931   BP: 109/75   Pulse: 70   Resp: 18   Temp: 98.2 °F (36.8 °C)     Physical Exam   Vitals reviewed.  Constitutional: She is oriented to person, place, and time. She appears well-developed and well-nourished.   HENT:   Head: Normocephalic and atraumatic.   Eyes: Conjunctivae and EOM are normal. Pupils are equal, round, and reactive to light.   Neck: Normal range of motion. Neck supple.   Cardiovascular: Normal rate, regular rhythm, normal heart sounds and intact distal pulses.    Pulmonary/Chest: Effort normal and breath sounds normal.   Abdominal: Soft. Bowel sounds are normal.   Musculoskeletal: Normal range of motion.   Neurological: She is alert and oriented to person, place, and time. She has normal reflexes.   Skin: Skin is warm and dry.     Psychiatric: She has a normal mood and affect. Her behavior is normal. Judgment and thought content normal.      Exam:  External Genitalia: normal hair distribution, no lesions  Urethral meatus: normal without  prolapse or caruncle  Urethra: without tenderness or mass  Bladder: without fullness or tenderness  Anus and perineum: appear normal  In and out cath performed today by me with 20 mL of urine residual  Levator ani tenderness: ++    LABS REVIEW:  UA today:   Color:Clear, Yellow  Spec. Grav.  1.025  PH  5.5  Trace blood    Cr:   Lab Results   Component Value Date    CREATININE 1.0 03/28/2018     HX OF IMAGING/US RESULTS:  RENAL/BLADDER US 5/22/18:  IMPRESSION:  Normal kidneys without evidence of hydronephrosis or significant residual urine in post void bladder.    CT Abdomen Pelvis with Contrast 3/28/18:  IMPRESSION:  No acute findings in the abdomen or pelvis to explain this patient's symptoms.  No significant interval change when compared to 03/21/2018.    CT Abdomen Pelvis with Contrast on 3/21/18:  IMPRESSION:  Small amount of fluid identified in the right cul-de-sac probably physiologic.  Prominent amount of stool and gas seen in the colon suggesting constipation.  Otherwise negative CT of the abdomen and pelvis.    Assessment:       1. Bladder pain    2. Dysfunctional voiding of urine    3. Urinary urgency    4. Pelvic pain          Plan:   Pelvic pain, bladder pain:    UA, Urine culture, UA Micro to be performed.    F/u:  The following recommendations were made to pt and her mother:  --Get established with OB/GYN and have full examination performed.  --F/up and schedule with Dr. Aguirre for recheck of levator muscles and its involvement with her current symptoms and to discuss with pt and mother other options if any for treatment if physical therapy for pelvic floor is too costly at this time.    [facility]: Active    Bridget Kaiser, BRO

## 2019-06-16 LAB — BACTERIA UR CULT: NO GROWTH
